# Patient Record
Sex: FEMALE | Race: WHITE | NOT HISPANIC OR LATINO | Employment: FULL TIME | URBAN - METROPOLITAN AREA
[De-identification: names, ages, dates, MRNs, and addresses within clinical notes are randomized per-mention and may not be internally consistent; named-entity substitution may affect disease eponyms.]

---

## 2017-04-18 ENCOUNTER — TRANSCRIBE ORDERS (OUTPATIENT)
Dept: ADMINISTRATIVE | Facility: HOSPITAL | Age: 42
End: 2017-04-18

## 2017-04-18 ENCOUNTER — HOSPITAL ENCOUNTER (OUTPATIENT)
Dept: ULTRASOUND IMAGING | Facility: HOSPITAL | Age: 42
Discharge: HOME | End: 2017-04-18
Admitting: NURSE PRACTITIONER

## 2017-04-18 ENCOUNTER — HOSPITAL ENCOUNTER (OUTPATIENT)
Dept: ULTRASOUND IMAGING | Facility: HOSPITAL | Age: 42
Discharge: HOME | End: 2017-04-18

## 2017-04-18 DIAGNOSIS — R10.31 ABDOMINAL PAIN, RLQ (RIGHT LOWER QUADRANT): ICD-10-CM

## 2017-04-18 DIAGNOSIS — R19.03 RLQ ABDOMINAL MASS: ICD-10-CM

## 2017-04-18 DIAGNOSIS — R19.03 ABDOMINAL SWELLING, RIGHT LOWER QUADRANT: ICD-10-CM

## 2017-04-18 DIAGNOSIS — R19.03 ABDOMINAL SWELLING, RIGHT LOWER QUADRANT: Primary | ICD-10-CM

## 2017-04-18 PROCEDURE — 76856 US EXAM PELVIC COMPLETE: CPT

## 2017-04-18 PROCEDURE — 76830 TRANSVAGINAL US NON-OB: CPT

## 2017-04-19 ENCOUNTER — APPOINTMENT (OUTPATIENT)
Dept: ULTRASOUND IMAGING | Facility: HOSPITAL | Age: 42
End: 2017-04-19

## 2017-10-17 ENCOUNTER — APPOINTMENT (OUTPATIENT)
Dept: GENERAL RADIOLOGY | Facility: HOSPITAL | Age: 42
End: 2017-10-17

## 2017-10-17 ENCOUNTER — HOSPITAL ENCOUNTER (EMERGENCY)
Facility: HOSPITAL | Age: 42
Discharge: HOME | End: 2017-10-17
Admitting: PHYSICIAN ASSISTANT

## 2017-10-17 VITALS
SYSTOLIC BLOOD PRESSURE: 110 MMHG | BODY MASS INDEX: 19.63 KG/M2 | OXYGEN SATURATION: 97 % | TEMPERATURE: 98.7 F | WEIGHT: 115 LBS | HEIGHT: 64 IN | HEART RATE: 70 BPM | RESPIRATION RATE: 14 BRPM | DIASTOLIC BLOOD PRESSURE: 82 MMHG

## 2017-10-17 DIAGNOSIS — J06.9 UPPER RESPIRATORY TRACT INFECTION, UNSPECIFIED TYPE: Primary | ICD-10-CM

## 2017-10-17 LAB
FLUAV AG NPH QL: NEGATIVE
FLUBV AG NPH QL IA: NEGATIVE

## 2017-10-17 PROCEDURE — 99284 EMERGENCY DEPT VISIT MOD MDM: CPT

## 2017-10-17 PROCEDURE — 87804 INFLUENZA ASSAY W/OPTIC: CPT

## 2017-10-17 PROCEDURE — 71020 HC CHEST PA AND LATERAL: CPT

## 2017-10-17 PROCEDURE — 99284 EMERGENCY DEPT VISIT MOD MDM: CPT | Performed by: PHYSICIAN ASSISTANT

## 2017-10-17 RX ORDER — ALBUTEROL SULFATE 90 UG/1
2 AEROSOL, METERED RESPIRATORY (INHALATION) EVERY 6 HOURS PRN
Qty: 1 INHALER | Refills: 0 | Status: SHIPPED | OUTPATIENT
Start: 2017-10-17 | End: 2019-06-03

## 2017-10-17 RX ORDER — BENZONATATE 100 MG/1
100 CAPSULE ORAL 3 TIMES DAILY PRN
Qty: 20 CAPSULE | Refills: 0 | Status: SHIPPED | OUTPATIENT
Start: 2017-10-17 | End: 2017-10-24

## 2017-10-17 RX ORDER — GUAIFENESIN 600 MG/1
600 TABLET, EXTENDED RELEASE ORAL ONCE
Status: COMPLETED | OUTPATIENT
Start: 2017-10-17 | End: 2017-10-17

## 2017-10-17 RX ORDER — SODIUM CHLORIDE 0.9 % (FLUSH) 0.9 %
10 SYRINGE (ML) INJECTION AS NEEDED
Status: DISCONTINUED | OUTPATIENT
Start: 2017-10-17 | End: 2017-10-17

## 2017-10-17 RX ORDER — BENZONATATE 100 MG/1
100 CAPSULE ORAL ONCE
Status: COMPLETED | OUTPATIENT
Start: 2017-10-17 | End: 2017-10-17

## 2017-10-17 RX ADMIN — GUAIFENESIN 600 MG: 600 TABLET, EXTENDED RELEASE ORAL at 14:31

## 2017-10-17 RX ADMIN — BENZONATATE 100 MG: 100 CAPSULE, LIQUID FILLED ORAL at 14:31

## 2017-10-17 NOTE — ED PROVIDER NOTES
"Subjective   History of Present Illness  History of Present Illness    Chief complaint: \"I think I have pneumonia\"    Location: Generalized    Quality/Severity:  Congested, moderate severe    Timing/Duration: 2 days.  Worse this morning.    Modifying Factors: Nothing makes worse or better    Associated Symptoms: Positive runny nose.  Positive nonproductive cough. Positive headache.  Denies neck pain.  Denies fevers or chills.  Denies ear pain.  Denies sore throat.  Denies chest pain.  Denies shortness of breath.    Narrative: 41-year-old female presents with runny nose and productive cough for 2 days that was worse this morning.  She went to the urgent care Center this morning.  When she was complaining of feeling congested and not being able to breathe they thought she was saying that she was short of breath and sent her to the emergency department.  She denies actually being short of breath.  She states she is just feeling congested like when she previously had pneumonia.  They did do a CBC with an normal white blood cell count and an EKG, which was also unremarkable.  Please see details below.    Review of Systems  General: Denies fevers or chills.  Denies any weakness or fatigue.  Denies any weight loss or weight gain.  SKIN: Denies any rashes lesions or ulcers.  Denies color change.  ENT: Denies sore throat. +  rhinorrhea.  Denies ear pain.    EYES: Denies any blurred vision.  Denies any change in vision.  Denies any photophobia.  Denies any vision loss.  LUNGS: Denies any shortness of breath or wheezing.  + cough.  Denies any hemoptysis.  CARDIAC: Denies any chest pain.  Denies palpitations.  Denies syncope.  Denies any edema  ABD: Denies any abdominal pain.  Denies any nausea or vomiting or diarrhea.  Denies any rectal bleeding.  Denies constipation  : Denies any dysuria, urgency, frequency or hematuria.  Denies discharge.  Denies flank pain.  NEURO: Denies any focal weakness.  Denies headache.  Denies " seizures.  Denies changes in speech or difficulty walking.  ENDOCRINE: Denies polydipsia and polyuria  M/S: Denies arthralgias, back pain, myalgias or neck pain  HEME/LYMPH: Negative for adenopathy. Does not bruise/bleed easily.   PSYCH: Negative for suicidal ideas. + anxiety  review was performed in addition to those in the above all other reviews are negative.      Past Medical History:   Diagnosis Date   • Anxiety    • Migraine        Allergies   Allergen Reactions   • Sulfamethoxazole-Trimethoprim        Past Surgical History:   Procedure Laterality Date   • TUBAL ABDOMINAL LIGATION         History reviewed. No pertinent family history.    Social History     Social History   • Marital status: Single     Spouse name: N/A   • Number of children: N/A   • Years of education: N/A     Social History Main Topics   • Smoking status: Never Smoker   • Smokeless tobacco: None   • Alcohol use No   • Drug use: Yes     Special: Methamphetamines, Marijuana      Comment: DRUG FREE SINCE 2009  SMOKED sUN   • Sexual activity: Yes     Birth control/ protection: None      Comment: TUBAL     Other Topics Concern   • None     Social History Narrative   • None     No current facility-administered medications on file prior to encounter.      No current outpatient prescriptions on file prior to encounter.           Objective   Physical Exam  Vitals:    10/17/17 1329   BP: 117/80   Pulse: 69   Resp: 20   Temp: 97.2 °F (36.2 °C)   SpO2: 100%     GENERAL: a/o x 4, NAD  SKIN: Warm pink and dry   HEENT:  PERRLA, EOM intact, conjunctiva normal, sclera clear, nasal congestion, TM clear, oropharynx clear without pharyngeal erythema, edema or exudate  NECK: supple, no JVD  LUNGS: Clear to auscultation bilaterally without wheezes, rales or rhonchi.  No accessory muscle use and no nasal flaring.  CARDIAC:  Regular rate and rhythm, S1-S2.  No murmurs, rubs or gallops.  No peripheral edema.  Equal pulses bilaterally.  ABDOMEN: Soft, nontender,  nondistended.  No guarding or rebound tenderness.  Normal bowel sounds.  MUSCULOSKELETAL: Moves all extremities well.  No deformity.  NEURO: Cranial nerves II through XII grossly intact.  No gross focal deficits.  Alert.  Normal speech and motor.  PSYCH: anxious      Procedures         ED Course  ED Course      EKG from [facility]      EKG time / Interpretation time:1254 / 1345  Rhythm/Rate: NSR 61   WV: 154  QRS, axis: 56   QTc 415  ST and T waves: inversion aVR   Interpreted Contemporaneously by me, independently viewed by me and MD.    CBC from [facility]  WBC 9.0, Hgb 14.8, Plt 419    Results for orders placed or performed during the hospital encounter of 10/17/17   Influenza Antigen, Rapid - Swab, Nasopharynx   Result Value Ref Range    Influenza A Ag, EIA Negative Negative    Influenza B Ag, EIA Negative Negative     Xr Chest 2 View    Result Date: 10/17/2017  Narrative: INDICATION:  Shortness of air cough and chest pain.  COMPARISON:  04/22/2016  FINDINGS: PA and lateral views of the chest.  Heart and mediastinal contours are normal.  The lungs are clear.  No pneumothorax or pleural effusion.      Impression: Normal chest radiograph.  This report was finalized on 10/17/2017 2:22 PM by Dr. Dev Worthy MD.      Reviewed CXR. Independently viewed by me. Interpreted by radiologist. Discussed with pt. No indication for antibiotics at this time as patient is afebrile with a normal white blood cell count.  She has a negative flu.  This is most likely another virus.  Patient has been given instructions on symptoms that would cause her to need to return to the emergency department.  She will follow up with primary care provider.  We will discharge home with Tessalon and albuterol.  She will take Mucinex over-the-counter.  She still does not have any SOA.  She states she is ready to go home because she has to feed her baby squirrel            MDM  Number of Diagnoses or Management Options  Upper respiratory tract infection,  unspecified type: new and requires workup     Amount and/or Complexity of Data Reviewed  Clinical lab tests: reviewed and ordered  Tests in the radiology section of CPT®: reviewed and ordered  Tests in the medicine section of CPT®: ordered and reviewed  Independent visualization of images, tracings, or specimens: yes    Risk of Complications, Morbidity, and/or Mortality  Presenting problems: moderate  Diagnostic procedures: low  Management options: low    Patient Progress  Patient progress: improved    Wells 0 = low probability for PE  My differential diagnosis for dyspnea includes but is not limited to:  Asthma, COPD, pneumonia, pulmonary embolus, acute respiratory distress syndrome, pneumothorax, pleural effusion, pulmonary fibrosis, congestive heart failure, myocardial infarction, DKA, uremia, acidosis, sepsis, anemia, drug related, hyperventilation, CNS disease    Final diagnoses:   Upper respiratory tract infection, unspecified type       Dictated utilizing Dragon dictation       Cheli Merrill PA-C  10/17/17 0218

## 2018-08-14 ENCOUNTER — TRANSCRIBE ORDERS (OUTPATIENT)
Dept: ADMINISTRATIVE | Facility: HOSPITAL | Age: 43
End: 2018-08-14

## 2018-08-14 DIAGNOSIS — Z13.9 SCREENING FOR CONDITION: Primary | ICD-10-CM

## 2018-09-04 ENCOUNTER — OFFICE VISIT (OUTPATIENT)
Dept: OBSTETRICS AND GYNECOLOGY | Facility: CLINIC | Age: 43
End: 2018-09-04

## 2018-09-04 VITALS
SYSTOLIC BLOOD PRESSURE: 100 MMHG | BODY MASS INDEX: 20.49 KG/M2 | HEIGHT: 64 IN | DIASTOLIC BLOOD PRESSURE: 62 MMHG | WEIGHT: 120 LBS

## 2018-09-04 DIAGNOSIS — R87.613 HGSIL (HIGH GRADE SQUAMOUS INTRAEPITHELIAL LESION) ON PAP SMEAR OF CERVIX: Primary | ICD-10-CM

## 2018-09-04 DIAGNOSIS — Z13.9 SCREENING FOR CONDITION: ICD-10-CM

## 2018-09-04 LAB
B-HCG UR QL: NEGATIVE
INTERNAL NEGATIVE CONTROL: NEGATIVE
INTERNAL POSITIVE CONTROL: POSITIVE
Lab: NORMAL

## 2018-09-04 PROCEDURE — 57454 BX/CURETT OF CERVIX W/SCOPE: CPT | Performed by: OBSTETRICS & GYNECOLOGY

## 2018-09-04 PROCEDURE — 81025 URINE PREGNANCY TEST: CPT | Performed by: OBSTETRICS & GYNECOLOGY

## 2018-09-04 NOTE — PROGRESS NOTES
Colposcopy Procedure Note    Indications: Most recent Pap smear showed: high-grade squamous intraepithelial neoplasia  (HGSIL-encompassing moderate and severe dysplasia).  Prior cervical/vaginal disease: normal exam without visible pathology.  Prior cervical treatment: cryosurgery.    Procedure Details   The risks and benefits of the procedure and Verbal informed consent obtained.    Speculum placed in vagina and excellent visualization of cervix achieved, cervix swabbed x 3 with acetic acid solution and Lugol's solution     Findings:  Cervix: visible lesion(s) at 6 o'clock and acetowhite lesion(s) noted at 6 o'clock; cervix swabbed with Lugol's solution, SCJ visualized - lesion at 6 o'clock, endocervical curettage performed, cervical biopsies taken at 6 o'clock, specimen labelled and sent to pathology and hemostasis achieved with Monsel's solution.  Vaginal inspection: vaginal colposcopy not performed.  Vulvar colposcopy: vulvar colposcopy not performed.    OBGyn Exam    Specimens: 6:00 and ECC    Complications: none.  Patient tolerated the procedure well.    Plan:  Specimens labelled and sent to Pathology.  Will base further treatment on Pathology findings.  Return to discuss Pathology results in 2 weeks.    COLUMBA Bower MD  9/4/2018  2:54 PM

## 2018-09-06 ENCOUNTER — TELEPHONE (OUTPATIENT)
Dept: OBSTETRICS AND GYNECOLOGY | Facility: CLINIC | Age: 43
End: 2018-09-06

## 2018-09-06 LAB
DX ICD CODE: NORMAL
DX ICD CODE: NORMAL
PATH REPORT.FINAL DX SPEC: NORMAL
PATH REPORT.GROSS SPEC: NORMAL
PATH REPORT.SITE OF ORIGIN SPEC: NORMAL
PATHOLOGIST NAME: NORMAL
PAYMENT PROCEDURE: NORMAL

## 2018-09-06 NOTE — TELEPHONE ENCOUNTER
Patient has colpo yesterday. She is c/o starting her period today and hurts with tampons. Want to know if she can have note to be off work today?

## 2018-09-18 ENCOUNTER — OFFICE VISIT (OUTPATIENT)
Dept: OBSTETRICS AND GYNECOLOGY | Facility: CLINIC | Age: 43
End: 2018-09-18

## 2018-09-18 VITALS
BODY MASS INDEX: 20.32 KG/M2 | SYSTOLIC BLOOD PRESSURE: 110 MMHG | DIASTOLIC BLOOD PRESSURE: 60 MMHG | HEIGHT: 64 IN | WEIGHT: 119 LBS

## 2018-09-18 DIAGNOSIS — R87.613 HGSIL (HIGH GRADE SQUAMOUS INTRAEPITHELIAL LESION) ON PAP SMEAR OF CERVIX: Primary | ICD-10-CM

## 2018-09-18 PROCEDURE — 99213 OFFICE O/P EST LOW 20 MIN: CPT | Performed by: OBSTETRICS & GYNECOLOGY

## 2018-09-18 NOTE — PROGRESS NOTES
"      Luanne Sun is a 42 y.o. patient who presents for follow up of   Chief Complaint   Patient presents with   • Follow-up     colpo results       HPI 42-year-old female who recently had a high-grade Pap followed by colposcopy.  Postoperatively the patient did well.  She had some vaginal bleeding that occurred afterwards but still has stopped since she has no pain and no fevers.    The following portions of the patient's history were reviewed and updated as appropriate: allergies, current medications and problem list.    Review of Systems   Constitutional: Negative for appetite change, fever and unexpected weight change.   Respiratory: Negative for cough and shortness of breath.    Cardiovascular: Negative for chest pain and palpitations.   Gastrointestinal: Negative for abdominal distention, abdominal pain, constipation, diarrhea, nausea and vomiting.   Endocrine: Negative.    Genitourinary: Negative for dyspareunia, menstrual problem, pelvic pain and vaginal discharge.   Skin: Negative.    Hematological: Negative.    Psychiatric/Behavioral: Negative for dysphoric mood and sleep disturbance. The patient is not nervous/anxious.        /60   Ht 162.6 cm (64\")   Wt 54 kg (119 lb)   BMI 20.43 kg/m²     Physical Exam   Constitutional: She is oriented to person, place, and time. She appears well-developed and well-nourished.   HENT:   Head: Normocephalic and atraumatic.   Pulmonary/Chest: Effort normal.   Abdominal: Soft. She exhibits no distension and no mass. There is no tenderness. There is no rebound and no guarding.   Musculoskeletal: Normal range of motion.   Neurological: She is alert and oriented to person, place, and time.   Skin: Skin is warm and dry.   Psychiatric: She has a normal mood and affect. Her behavior is normal. Judgment and thought content normal.   Nursing note and vitals reviewed.        Assessment/Plan    Luanne was seen today for follow-up.    Diagnoses and all orders for this " visit:    HGSIL (high grade squamous intraepithelial lesion) on Pap smear of cervix    Biopsy and ECC were reviewed with the patient both normal.  Obviously there is some high-grade squamous lesions there which were not biopsied.  I recommended a LEEP.  Patient requested to be done under general anesthesia she would not tolerate office procedures.  The procedure was described in detail and she desires to schedule.    Return for LEEP in OR.      Sorin Bower MD  9/18/2018  2:47 PM

## 2018-09-21 ENCOUNTER — PREP FOR SURGERY (OUTPATIENT)
Dept: OTHER | Facility: HOSPITAL | Age: 43
End: 2018-09-21

## 2018-09-21 ENCOUNTER — ANESTHESIA EVENT (OUTPATIENT)
Dept: PERIOP | Facility: HOSPITAL | Age: 43
End: 2018-09-21

## 2018-09-21 DIAGNOSIS — R87.613 HGSIL (HIGH GRADE SQUAMOUS INTRAEPITHELIAL LESION) ON PAP SMEAR OF CERVIX: Primary | ICD-10-CM

## 2018-09-21 RX ORDER — SODIUM CHLORIDE 0.9 % (FLUSH) 0.9 %
1-10 SYRINGE (ML) INJECTION AS NEEDED
Status: CANCELLED | OUTPATIENT
Start: 2018-09-21

## 2018-09-21 RX ORDER — SODIUM CHLORIDE 9 MG/ML
40 INJECTION, SOLUTION INTRAVENOUS AS NEEDED
Status: CANCELLED | OUTPATIENT
Start: 2018-09-21

## 2018-09-24 ENCOUNTER — ANESTHESIA (OUTPATIENT)
Dept: PERIOP | Facility: HOSPITAL | Age: 43
End: 2018-09-24

## 2018-09-24 ENCOUNTER — HOSPITAL ENCOUNTER (OUTPATIENT)
Facility: HOSPITAL | Age: 43
Setting detail: HOSPITAL OUTPATIENT SURGERY
Discharge: HOME | End: 2018-09-24
Attending: OBSTETRICS & GYNECOLOGY | Admitting: OBSTETRICS & GYNECOLOGY

## 2018-09-24 VITALS
WEIGHT: 118.4 LBS | DIASTOLIC BLOOD PRESSURE: 89 MMHG | BODY MASS INDEX: 20.22 KG/M2 | TEMPERATURE: 98.7 F | HEIGHT: 64 IN | SYSTOLIC BLOOD PRESSURE: 117 MMHG | RESPIRATION RATE: 15 BRPM | HEART RATE: 87 BPM | OXYGEN SATURATION: 98 %

## 2018-09-24 DIAGNOSIS — R87.613 HGSIL (HIGH GRADE SQUAMOUS INTRAEPITHELIAL LESION) ON PAP SMEAR OF CERVIX: ICD-10-CM

## 2018-09-24 LAB — HCG SERPL QL: NEGATIVE

## 2018-09-24 PROCEDURE — 25010000002 FENTANYL CITRATE (PF) 100 MCG/2ML SOLUTION: Performed by: NURSE ANESTHETIST, CERTIFIED REGISTERED

## 2018-09-24 PROCEDURE — 57522 CONIZATION OF CERVIX: CPT | Performed by: OBSTETRICS & GYNECOLOGY

## 2018-09-24 PROCEDURE — 25010000002 ONDANSETRON PER 1 MG: Performed by: NURSE ANESTHETIST, CERTIFIED REGISTERED

## 2018-09-24 PROCEDURE — 25010000002 MIDAZOLAM PER 1 MG: Performed by: NURSE ANESTHETIST, CERTIFIED REGISTERED

## 2018-09-24 PROCEDURE — 25010000002 PROPOFOL 10 MG/ML EMULSION: Performed by: NURSE ANESTHETIST, CERTIFIED REGISTERED

## 2018-09-24 PROCEDURE — 84703 CHORIONIC GONADOTROPIN ASSAY: CPT | Performed by: OBSTETRICS & GYNECOLOGY

## 2018-09-24 PROCEDURE — 25010000002 KETOROLAC TROMETHAMINE PER 15 MG: Performed by: NURSE ANESTHETIST, CERTIFIED REGISTERED

## 2018-09-24 PROCEDURE — 25010000002 DEXAMETHASONE PER 1 MG: Performed by: NURSE ANESTHETIST, CERTIFIED REGISTERED

## 2018-09-24 RX ORDER — SODIUM CHLORIDE 0.9 % (FLUSH) 0.9 %
1-10 SYRINGE (ML) INJECTION AS NEEDED
Status: DISCONTINUED | OUTPATIENT
Start: 2018-09-24 | End: 2018-09-24 | Stop reason: HOSPADM

## 2018-09-24 RX ORDER — MIDAZOLAM HYDROCHLORIDE 1 MG/ML
1 INJECTION INTRAMUSCULAR; INTRAVENOUS
Status: DISCONTINUED | OUTPATIENT
Start: 2018-09-24 | End: 2018-09-24 | Stop reason: HOSPADM

## 2018-09-24 RX ORDER — ONDANSETRON 2 MG/ML
4 INJECTION INTRAMUSCULAR; INTRAVENOUS ONCE AS NEEDED
Status: COMPLETED | OUTPATIENT
Start: 2018-09-24 | End: 2018-09-24

## 2018-09-24 RX ORDER — PROPOFOL 10 MG/ML
VIAL (ML) INTRAVENOUS AS NEEDED
Status: DISCONTINUED | OUTPATIENT
Start: 2018-09-24 | End: 2018-09-24 | Stop reason: SURG

## 2018-09-24 RX ORDER — LIDOCAINE HYDROCHLORIDE 10 MG/ML
0.5 INJECTION, SOLUTION EPIDURAL; INFILTRATION; INTRACAUDAL; PERINEURAL ONCE AS NEEDED
Status: COMPLETED | OUTPATIENT
Start: 2018-09-24 | End: 2018-09-24

## 2018-09-24 RX ORDER — FENTANYL CITRATE 50 UG/ML
INJECTION, SOLUTION INTRAMUSCULAR; INTRAVENOUS AS NEEDED
Status: DISCONTINUED | OUTPATIENT
Start: 2018-09-24 | End: 2018-09-24 | Stop reason: SURG

## 2018-09-24 RX ORDER — MIDAZOLAM HYDROCHLORIDE 1 MG/ML
2 INJECTION INTRAMUSCULAR; INTRAVENOUS
Status: DISCONTINUED | OUTPATIENT
Start: 2018-09-24 | End: 2018-09-24 | Stop reason: HOSPADM

## 2018-09-24 RX ORDER — FERRIC SUBSULFATE 0.21 G/G
LIQUID TOPICAL AS NEEDED
Status: DISCONTINUED | OUTPATIENT
Start: 2018-09-24 | End: 2018-09-24 | Stop reason: HOSPADM

## 2018-09-24 RX ORDER — SODIUM CHLORIDE 9 MG/ML
40 INJECTION, SOLUTION INTRAVENOUS AS NEEDED
Status: DISCONTINUED | OUTPATIENT
Start: 2018-09-24 | End: 2018-09-24 | Stop reason: HOSPADM

## 2018-09-24 RX ORDER — KETOROLAC TROMETHAMINE 30 MG/ML
INJECTION, SOLUTION INTRAMUSCULAR; INTRAVENOUS AS NEEDED
Status: DISCONTINUED | OUTPATIENT
Start: 2018-09-24 | End: 2018-09-24 | Stop reason: SURG

## 2018-09-24 RX ORDER — PYRANTEL PAMOATE 100 %
POWDER (GRAM) MISCELLANEOUS AS NEEDED
Status: DISCONTINUED | OUTPATIENT
Start: 2018-09-24 | End: 2018-09-24 | Stop reason: HOSPADM

## 2018-09-24 RX ORDER — MAGNESIUM HYDROXIDE 1200 MG/15ML
LIQUID ORAL AS NEEDED
Status: DISCONTINUED | OUTPATIENT
Start: 2018-09-24 | End: 2018-09-24 | Stop reason: HOSPADM

## 2018-09-24 RX ORDER — DEXAMETHASONE SODIUM PHOSPHATE 4 MG/ML
8 INJECTION, SOLUTION INTRA-ARTICULAR; INTRALESIONAL; INTRAMUSCULAR; INTRAVENOUS; SOFT TISSUE ONCE AS NEEDED
Status: COMPLETED | OUTPATIENT
Start: 2018-09-24 | End: 2018-09-24

## 2018-09-24 RX ORDER — HYDROCODONE BITARTRATE AND ACETAMINOPHEN 5; 325 MG/1; MG/1
2 TABLET ORAL EVERY 4 HOURS PRN
Qty: 20 TABLET | Refills: 0 | Status: SHIPPED | OUTPATIENT
Start: 2018-09-24 | End: 2019-06-03

## 2018-09-24 RX ORDER — FAMOTIDINE 20 MG/1
20 TABLET, FILM COATED ORAL EVERY 12 HOURS SCHEDULED
Status: DISCONTINUED | OUTPATIENT
Start: 2018-09-24 | End: 2018-09-24 | Stop reason: HOSPADM

## 2018-09-24 RX ORDER — SODIUM CHLORIDE, SODIUM LACTATE, POTASSIUM CHLORIDE, CALCIUM CHLORIDE 600; 310; 30; 20 MG/100ML; MG/100ML; MG/100ML; MG/100ML
9 INJECTION, SOLUTION INTRAVENOUS CONTINUOUS
Status: DISCONTINUED | OUTPATIENT
Start: 2018-09-24 | End: 2018-09-24 | Stop reason: HOSPADM

## 2018-09-24 RX ORDER — GLYCOPYRROLATE 0.2 MG/ML
0.1 INJECTION INTRAMUSCULAR; INTRAVENOUS
Status: DISCONTINUED | OUTPATIENT
Start: 2018-09-24 | End: 2018-09-24 | Stop reason: HOSPADM

## 2018-09-24 RX ADMIN — DEXAMETHASONE SODIUM PHOSPHATE 8 MG: 4 INJECTION, SOLUTION INTRAMUSCULAR; INTRAVENOUS at 11:00

## 2018-09-24 RX ADMIN — PROPOFOL 200 MG: 10 INJECTION, EMULSION INTRAVENOUS at 11:47

## 2018-09-24 RX ADMIN — GLYCOPYRROLATE 0.1 MG: 0.2 INJECTION INTRAMUSCULAR; INTRAVENOUS at 11:00

## 2018-09-24 RX ADMIN — ONDANSETRON 4 MG: 2 INJECTION INTRAMUSCULAR; INTRAVENOUS at 11:00

## 2018-09-24 RX ADMIN — MIDAZOLAM HYDROCHLORIDE 1 MG: 1 INJECTION, SOLUTION INTRAMUSCULAR; INTRAVENOUS at 11:39

## 2018-09-24 RX ADMIN — FAMOTIDINE 20 MG: 10 INJECTION, SOLUTION INTRAVENOUS at 11:00

## 2018-09-24 RX ADMIN — FENTANYL CITRATE 50 MCG: 50 INJECTION, SOLUTION INTRAMUSCULAR; INTRAVENOUS at 11:51

## 2018-09-24 RX ADMIN — SODIUM CHLORIDE, POTASSIUM CHLORIDE, SODIUM LACTATE AND CALCIUM CHLORIDE 9 ML/HR: 600; 310; 30; 20 INJECTION, SOLUTION INTRAVENOUS at 10:36

## 2018-09-24 RX ADMIN — KETOROLAC TROMETHAMINE 30 MG: 30 INJECTION INTRAMUSCULAR; INTRAVENOUS at 11:51

## 2018-09-24 RX ADMIN — LIDOCAINE HYDROCHLORIDE 0.5 ML: 10 INJECTION, SOLUTION EPIDURAL; INFILTRATION; INTRACAUDAL; PERINEURAL at 10:36

## 2018-09-24 NOTE — ANESTHESIA PREPROCEDURE EVALUATION
Anesthesia Evaluation     Patient summary reviewed and Nursing notes reviewed   no history of anesthetic complications:  NPO Solid Status: > 8 hours  NPO Liquid Status: > 2 hours           Airway   Mallampati: I  TM distance: >3 FB  Neck ROM: full  No difficulty expected  Dental      Comment: Very loose lower R      Pulmonary - negative pulmonary ROS and normal exam    breath sounds clear to auscultation  Cardiovascular - negative cardio ROS and normal exam  Exercise tolerance: excellent (>7 METS)    Rhythm: regular  Rate: normal        Neuro/Psych  (+) headaches, psychiatric history Anxiety,     GI/Hepatic/Renal/Endo    (+)  GERD (Rx w/ OTC prn),      Musculoskeletal (-) negative ROS    Abdominal  - normal exam   Substance History - negative use     OB/GYN      Comment: HGSIL (high grade squamous intraepithelial lesion) on Pap smear of cervix      Other - negative ROS       (-) history of cancer                  Anesthesia Plan    ASA 2     general     intravenous induction   Anesthetic plan, all risks, benefits, and alternatives have been provided, discussed and informed consent has been obtained with: patient.  Use of blood products discussed with patient  Consented to blood products.

## 2018-09-24 NOTE — ANESTHESIA PROCEDURE NOTES
Airway  Urgency: elective    Date/Time: 9/24/2018 11:48 AM  End Time:9/24/2018 11:48 AM  Airway not difficult    General Information and Staff    Patient location during procedure: OR  CRNA: ULICES GALLO    Indications and Patient Condition    Preoxygenated: yes  MILS maintained throughout  Mask difficulty assessment: 0 - not attempted    Final Airway Details  Final airway type: supraglottic airway      Successful airway: unique  Size 3    Number of attempts at approach: 1    Additional Comments  BEBS, +ETCO2, VSS. TEETH AND GUMS AS PRE-OP.

## 2018-09-27 LAB
LAB AP CASE REPORT: NORMAL
LAB AP CLINICAL INFORMATION: NORMAL
PATH REPORT.FINAL DX SPEC: NORMAL

## 2018-10-09 ENCOUNTER — OFFICE VISIT (OUTPATIENT)
Dept: OBSTETRICS AND GYNECOLOGY | Facility: CLINIC | Age: 43
End: 2018-10-09

## 2018-10-09 VITALS
HEIGHT: 64 IN | BODY MASS INDEX: 21 KG/M2 | SYSTOLIC BLOOD PRESSURE: 102 MMHG | DIASTOLIC BLOOD PRESSURE: 68 MMHG | WEIGHT: 123 LBS

## 2018-10-09 DIAGNOSIS — R87.613 HGSIL (HIGH GRADE SQUAMOUS INTRAEPITHELIAL LESION) ON PAP SMEAR OF CERVIX: Primary | ICD-10-CM

## 2018-10-09 PROCEDURE — 99024 POSTOP FOLLOW-UP VISIT: CPT | Performed by: OBSTETRICS & GYNECOLOGY

## 2018-10-09 NOTE — PROGRESS NOTES
"      Luanne Sun is a 42 y.o. patient who presents for follow up of   Chief Complaint   Patient presents with   • Post-op       HPI 42-year-old female 2 weeks status post LEEP for high-grade squamous intraepithelial lesion of the cervix.  She reports her bleeding has stopped.  She has no pain currently.  She complains of right arm bruising from her IV.  It is tender to the touch but she does not palpate any cords.    Past Medical History:   Diagnosis Date   • Anxiety    • Migraine        Past Surgical History:   Procedure Laterality Date   • LEEP N/A 9/24/2018    Procedure: LOOP ELECTROCAUTERY EXCISION PROCEDURE BIOPSY OF CERVIX;  Surgeon: Sorin Bower MD;  Location: [facility];  Service: Obstetrics/Gynecology   • TUBAL ABDOMINAL LIGATION     • TUBAL ABDOMINAL LIGATION         The following portions of the patient's history were reviewed and updated as appropriate: allergies, current medications and problem list.    Review of Systems   Constitutional: Negative for appetite change, fever and unexpected weight change.   HENT: Negative for congestion and sore throat.    Respiratory: Negative for cough and shortness of breath.    Cardiovascular: Negative for chest pain and palpitations.   Gastrointestinal: Negative for abdominal distention, abdominal pain, constipation, diarrhea, nausea and vomiting.   Endocrine: Negative.    Genitourinary: Negative for dyspareunia, menstrual problem, pelvic pain and vaginal discharge.   Skin: Negative.    Neurological: Negative for dizziness and syncope.   Hematological: Negative.    Psychiatric/Behavioral: Negative for dysphoric mood and sleep disturbance. The patient is not nervous/anxious.        /68   Ht 162.6 cm (64\")   Wt 55.8 kg (123 lb)   LMP 09/11/2018   BMI 21.11 kg/m²     Physical Exam   Constitutional: She is oriented to person, place, and time. She appears well-developed and well-nourished.   HENT:   Head: Normocephalic and atraumatic. "   Pulmonary/Chest: Effort normal. No respiratory distress.   Abdominal: Soft. She exhibits no distension and no mass. There is no tenderness. There is no rebound and no guarding.   Musculoskeletal: Normal range of motion.   Neurological: She is alert and oriented to person, place, and time.   Skin: Skin is warm and dry.   Psychiatric: She has a normal mood and affect. Her behavior is normal. Judgment and thought content normal.   Nursing note and vitals reviewed.   2 patches of quarter size bruising on the right forearm.  No cords palpated.    Pathology was reviewed with the patient.  High grade squamous lesion seen.  No invasive carcinoma identified.  Clear margins.      Assessment/Plan    Luanne was seen today for post-op.    Diagnoses and all orders for this visit:    HGSIL (high grade squamous intraepithelial lesion) on Pap smear of cervix    Return to office in 6 months for Pap only.    Return in about 6 months (around 4/9/2019) for pap only.      Sorin Bower MD  10/9/2018  11:37 AM

## 2019-04-30 ENCOUNTER — OFFICE VISIT (OUTPATIENT)
Dept: OBSTETRICS AND GYNECOLOGY | Facility: CLINIC | Age: 44
End: 2019-04-30

## 2019-04-30 VITALS
HEIGHT: 64 IN | BODY MASS INDEX: 21.34 KG/M2 | DIASTOLIC BLOOD PRESSURE: 60 MMHG | SYSTOLIC BLOOD PRESSURE: 110 MMHG | WEIGHT: 125 LBS

## 2019-04-30 DIAGNOSIS — R87.613 HGSIL (HIGH GRADE SQUAMOUS INTRAEPITHELIAL LESION) ON PAP SMEAR OF CERVIX: ICD-10-CM

## 2019-04-30 DIAGNOSIS — Z01.419 PAP SMEAR, LOW-RISK: Primary | ICD-10-CM

## 2019-04-30 DIAGNOSIS — Z11.51 SPECIAL SCREENING EXAMINATION FOR HUMAN PAPILLOMAVIRUS (HPV): ICD-10-CM

## 2019-04-30 PROCEDURE — 99212 OFFICE O/P EST SF 10 MIN: CPT | Performed by: OBSTETRICS & GYNECOLOGY

## 2019-04-30 NOTE — PROGRESS NOTES
"      Luanne Sun is a 43 y.o. patient who presents for follow up of   Chief Complaint   Patient presents with   • Follow-up       HPI Here for 1st pap since [facility], HGSIL.     The following portions of the patient's history were reviewed and updated as appropriate: allergies, current medications and problem list.    Review of Systems    /60   Ht 162.6 cm (64\")   Wt 56.7 kg (125 lb)   Breastfeeding? No   BMI 21.46 kg/m²     Physical Exam   Constitutional: She is oriented to person, place, and time. She appears well-developed and well-nourished.   HENT:   Head: Normocephalic and atraumatic.   Pulmonary/Chest: Effort normal. No respiratory distress.   Abdominal: Soft. She exhibits no distension and no mass. There is no tenderness. There is no rebound and no guarding.   Genitourinary: There is no rash, tenderness or lesion on the right labia. There is no rash, tenderness or lesion on the left labia. Cervix exhibits no motion tenderness, no discharge and no friability. No erythema, tenderness or bleeding in the vagina. No foreign body in the vagina. No signs of injury around the vagina. No vaginal discharge found.   Musculoskeletal: Normal range of motion.   Neurological: She is alert and oriented to person, place, and time.   Skin: Skin is warm and dry.   Psychiatric: She has a normal mood and affect. Her behavior is normal. Judgment and thought content normal.   Nursing note and vitals reviewed.        Assessment/Plan    Luanne was seen today for follow-up.    Diagnoses and all orders for this visit:    HGSIL (high grade squamous intraepithelial lesion) on Pap smear of cervix    Pap done.    Return in about 6 months (around 10/30/2019).      Sorin Bower MD  4/30/2019  4:02 PM  "

## 2019-05-02 LAB
CYTOLOGIST CVX/VAG CYTO: NORMAL
CYTOLOGY CVX/VAG DOC THIN PREP: NORMAL
DX ICD CODE: NORMAL
HIV 1 & 2 AB SER-IMP: NORMAL
HPV I/H RISK 1 DNA CVX QL PROBE+SIG AMP: NEGATIVE
Lab: NORMAL
OTHER STN SPEC: NORMAL
PATH REPORT.FINAL DX SPEC: NORMAL
STAT OF ADQ CVX/VAG CYTO-IMP: NORMAL

## 2019-06-03 ENCOUNTER — APPOINTMENT (OUTPATIENT)
Dept: GENERAL RADIOLOGY | Facility: HOSPITAL | Age: 44
End: 2019-06-03

## 2019-06-03 ENCOUNTER — HOSPITAL ENCOUNTER (EMERGENCY)
Facility: HOSPITAL | Age: 44
Discharge: HOME | End: 2019-06-03
Attending: EMERGENCY MEDICINE | Admitting: EMERGENCY MEDICINE

## 2019-06-03 VITALS
SYSTOLIC BLOOD PRESSURE: 119 MMHG | HEART RATE: 80 BPM | BODY MASS INDEX: 21.34 KG/M2 | DIASTOLIC BLOOD PRESSURE: 77 MMHG | OXYGEN SATURATION: 100 % | WEIGHT: 125 LBS | HEIGHT: 64 IN | RESPIRATION RATE: 18 BRPM | TEMPERATURE: 99 F

## 2019-06-03 DIAGNOSIS — M25.511 CHRONIC RIGHT SHOULDER PAIN: ICD-10-CM

## 2019-06-03 DIAGNOSIS — M77.12 LEFT LATERAL EPICONDYLITIS: Primary | ICD-10-CM

## 2019-06-03 DIAGNOSIS — G89.29 CHRONIC RIGHT SHOULDER PAIN: ICD-10-CM

## 2019-06-03 PROCEDURE — 73030 X-RAY EXAM OF SHOULDER: CPT

## 2019-06-03 PROCEDURE — 99283 EMERGENCY DEPT VISIT LOW MDM: CPT

## 2019-06-03 PROCEDURE — 99282 EMERGENCY DEPT VISIT SF MDM: CPT | Performed by: EMERGENCY MEDICINE

## 2019-06-03 PROCEDURE — 73080 X-RAY EXAM OF ELBOW: CPT

## 2019-06-03 RX ORDER — IBUPROFEN 400 MG/1
800 TABLET ORAL ONCE
Status: COMPLETED | OUTPATIENT
Start: 2019-06-03 | End: 2019-06-03

## 2019-06-03 RX ADMIN — IBUPROFEN 800 MG: 400 TABLET ORAL at 13:18

## 2019-06-04 NOTE — ED PROVIDER NOTES
EMERGENCY DEPARTMENT ENCOUNTER      Room Number: 7/07    History is provided by the patient, no translation services needed    HPI:    Chief complaint: Right shoulder pain, left elbow pain    Location: Right shoulder, left elbow    Quality/Severity: Moderate to severe    Timing/Duration: Right shoulder pain x2 months, left elbow pain since this morning    Modifying Factors: Worsens with movement, has not had any medication prior to arrival.    Associated Symptoms: Positive for right shoulder pain and left elbow pain.  Denies any specific injury, nausea, vomiting, chest pain, shortness of breath, or changes in sensation.    Narrative: Pt is a 43 y.o. female who presents complaining of right shoulder pain for 2 months and left elbow pain since this morning.  Patient states she works at [employer] and does a lot of heavy lifting and repetitive movements.  She states her right shoulder pain increases with abduction.  She states she has not seen her primary care provider for either complaint, she called their office today but they did not have any openings so she came to the ED.      PMD: Warren Hassan MD    REVIEW OF SYSTEMS  Review of Systems   Constitutional: Negative for chills, fatigue and fever.   Respiratory: Negative for cough and shortness of breath.    Cardiovascular: Negative for chest pain.   Gastrointestinal: Negative for abdominal pain, nausea and vomiting.   Musculoskeletal: Positive for arthralgias. Negative for back pain, gait problem and neck pain.   Skin: Negative for color change, pallor and wound.   Neurological: Negative for dizziness, syncope, facial asymmetry, speech difficulty and numbness.   Psychiatric/Behavioral: Negative for confusion. The patient is not nervous/anxious.        PAST MEDICAL HISTORY  Active Ambulatory Problems     Diagnosis Date Noted   • HGSIL (high grade squamous intraepithelial lesion) on Pap smear of cervix 09/21/2018     Resolved Ambulatory Problems      Diagnosis Date Noted   • No Resolved Ambulatory Problems     Past Medical History:   Diagnosis Date   • Anxiety    • Migraine        PAST SURGICAL HISTORY  Past Surgical History:   Procedure Laterality Date   • LEEP N/A 9/24/2018    Procedure: LOOP ELECTROCAUTERY EXCISION PROCEDURE BIOPSY OF CERVIX;  Surgeon: Sorin Bower MD;  Location: [facility];  Service: Obstetrics/Gynecology   • TUBAL ABDOMINAL LIGATION     • TUBAL ABDOMINAL LIGATION         FAMILY HISTORY  History reviewed. No pertinent family history.    SOCIAL HISTORY  Social History     Socioeconomic History   • Marital status: Single     Spouse name: Not on file   • Number of children: Not on file   • Years of education: Not on file   • Highest education level: Not on file   Tobacco Use   • Smoking status: Never Smoker   • Smokeless tobacco: Never Used   Substance and Sexual Activity   • Alcohol use: No   • Drug use: Yes     Types: Methamphetamines, Marijuana     Comment: DRUG FREE SINCE 2009  SMOKED sUN   • Sexual activity: Yes     Birth control/protection: None     Comment: TUBAL       ALLERGIES  Sulfamethoxazole-trimethoprim    No current facility-administered medications for this encounter.     Current Outpatient Medications:   •  Aspirin-Salicylamide-Caffeine (BC HEADACHE POWDER PO), Take 1 packet by mouth As Needed., Disp: , Rfl:   •  Multiple Vitamins-Minerals (ADULT GUMMY PO), Take 1 tablet by mouth Daily., Disp: , Rfl:     PHYSICAL EXAM  ED Triage Vitals [06/03/19 1152]   Temp Heart Rate Resp BP SpO2   99 °F (37.2 °C) 80 18 119/77 100 %      Temp src Heart Rate Source Patient Position BP Location FiO2 (%)   Temporal Monitor Sitting Right arm --       Physical Exam   Constitutional: She is oriented to person, place, and time and well-developed, well-nourished, and in no distress.   HENT:   Head: Normocephalic and atraumatic.   Cardiovascular: Normal rate, regular rhythm, normal heart sounds and intact distal pulses.   Pulmonary/Chest:  Effort normal and breath sounds normal.   Musculoskeletal: She exhibits no edema or deformity.        Right shoulder: She exhibits decreased range of motion (Extent of active abduction is 90 degrees.).        Left elbow: She exhibits normal range of motion, no swelling and no deformity. Tenderness found. Lateral epicondyle tenderness noted.   Neurological: She is alert and oriented to person, place, and time. Gait normal.   Skin: Skin is warm and dry. No erythema.   Psychiatric: Mood, memory, affect and judgment normal.         LAB RESULTS        I ordered the above labs and reviewed the results    RADIOLOGY  Xr Shoulder 2+ View Right    Result Date: 6/3/2019  Narrative: RIGHT SHOULDER, 06/03/2019     HISTORY: 43-year-old female with 2 month history left elbow pain and right shoulder pain, which she originally noted after heavy lifting.  TECHNIQUE: Two view right shoulder series.  FINDINGS: The examination is negative. No fracture, dislocation or other acute osseous abnormality is demonstrated. Glenohumeral and acromioclavicular joints are unremarkable.      Impression: Negative right shoulder series.  This report was finalized on 6/3/2019 12:54 PM by Dr. Roque Mason MD.      Xr Elbow 3+ View Left    Result Date: 6/3/2019  Narrative: LEFT ELBOW, 06/03/2019     HISTORY: 43-year-old female with 2 month history left elbow pain and right shoulder pain, which she originally noted after heavy lifting.  TECHNIQUE: Three-view left elbow series.  FINDINGS: The examination is negative. No fracture, dislocation, arthropathy or other osseous abnormality. No visible joint effusion.      Impression: Negative left elbow series.  This report was finalized on 6/3/2019 12:53 PM by Dr. Roque Mason MD.        I ordered the above radiologic testing and reviewed the results    PROCEDURES  Procedures      PROGRESS AND CONSULTS  ED Course as of Jun 03 2247 Mon Jun 03, 2019   1315 Discussed x-ray results with patient informed  her her x-rays are negative for any fractures or dislocations.  Discussed that she may need additional imaging of her right shoulder and possibly physical therapy.  Recommended she follow-up with her primary care doctor for further evaluation.  She may take ibuprofen or Tylenol as directed over-the-counter for pain.  Patient is agreeable with plan and verbalizes understanding.  [KS]      ED Course User Index  [KS] Dominga Hall PA-C           MEDICAL DECISION MAKING  Results were reviewed/discussed with the patient and they were also made aware of online access. Pt also made aware that some labs, such as cultures, will not be resulted during ER visit and follow up with PMD is necessary.     MDM  Number of Diagnoses or Management Options  Chronic right shoulder pain:   Left lateral epicondylitis:      Amount and/or Complexity of Data Reviewed  Tests in the radiology section of CPT®: reviewed and ordered  Independent visualization of images, tracings, or specimens: yes    Risk of Complications, Morbidity, and/or Mortality  Presenting problems: low  Diagnostic procedures: low  Management options: low    Patient Progress  Patient progress: improved      My differential diagnosis includes but is not limited to contusions of the shoulder, forearm, arm, wrist, elbow or hand, dislocations of shoulder, elbow, wrist, digits, shoulder sprain, elbow sprain, wrist sprain, digit sprain, shoulder strain, arm strain, forearm strain, elbow strain, wrist strain, hand sprain, digit strain, lacerations of the upper extremity, fractures both closed and open of radius, ulna and humerus      DIAGNOSIS  Final diagnoses:   Left lateral epicondylitis   Chronic right shoulder pain       Latest Documented Vital Signs:  As of 10:47 PM  BP- 119/77 HR- 80 Temp- 99 °F (37.2 °C) (Temporal) O2 sat- 100%    DISPOSITION  Patient discharged home with instructions to follow-up with primary care provider.    Discussed pertinent imaging findings  with the patient/family.  Patient/Family voiced understanding of need to follow-up for recheck, further testing as needed.  Return to the emergency Department warnings were given.         Medication List      No changes were made to your prescriptions during this visit.           Follow-up Information     Warren Hassan MD. Call today.    Specialty:  Family Medicine  Why:  To schedule follow-up appointment  Contact information:  [address]  [place] [address] [phone]                     Dictated utilizing Dragon dictation       Dominga Hall PA-C  06/03/19 7618

## 2019-08-14 ENCOUNTER — LAB (OUTPATIENT)
Dept: LAB | Facility: HOSPITAL | Age: 44
End: 2019-08-14

## 2019-08-14 ENCOUNTER — TRANSCRIBE ORDERS (OUTPATIENT)
Dept: ADMINISTRATIVE | Facility: HOSPITAL | Age: 44
End: 2019-08-14

## 2019-08-14 DIAGNOSIS — R19.7 DIARRHEA, UNSPECIFIED TYPE: ICD-10-CM

## 2019-08-14 DIAGNOSIS — R19.7 DIARRHEA, UNSPECIFIED TYPE: Primary | ICD-10-CM

## 2019-08-14 LAB
ADV 40+41 DNA STL QL NAA+NON-PROBE: NOT DETECTED
ASTRO TYP 1-8 RNA STL QL NAA+NON-PROBE: NOT DETECTED
C CAYETANENSIS DNA STL QL NAA+NON-PROBE: NOT DETECTED
C DIFF TOX GENS STL QL NAA+PROBE: NEGATIVE
CAMPY SP DNA.DIARRHEA STL QL NAA+PROBE: NOT DETECTED
CRYPTOSP STL CULT: NOT DETECTED
E COLI DNA SPEC QL NAA+PROBE: NOT DETECTED
E HISTOLYT AG STL-ACNC: NOT DETECTED
EAEC PAA PLAS AGGR+AATA ST NAA+NON-PRB: NOT DETECTED
EC STX1 + STX2 GENES STL NAA+PROBE: NOT DETECTED
EPEC EAE GENE STL QL NAA+NON-PROBE: NOT DETECTED
ETEC LTA+ST1A+ST1B TOX ST NAA+NON-PROBE: NOT DETECTED
G LAMBLIA DNA SPEC QL NAA+PROBE: NOT DETECTED
LACTOFERRIN STL QL LA: NEGATIVE
NOROVIRUS GI+II RNA STL QL NAA+NON-PROBE: NOT DETECTED
P SHIGELLOIDES DNA STL QL NAA+PROBE: NOT DETECTED
RV RNA STL NAA+PROBE: NOT DETECTED
SALMONELLA DNA SPEC QL NAA+PROBE: NOT DETECTED
SAPO I+II+IV+V RNA STL QL NAA+NON-PROBE: NOT DETECTED
SHIGELLA SP+EIEC IPAH STL QL NAA+PROBE: NOT DETECTED
V CHOLERAE DNA SPEC QL NAA+PROBE: NOT DETECTED
VIBRIO DNA SPEC NAA+PROBE: NOT DETECTED
YERSINIA STL CULT: NOT DETECTED

## 2019-08-14 PROCEDURE — 87493 C DIFF AMPLIFIED PROBE: CPT

## 2019-08-14 PROCEDURE — 0097U HC BIOFIRE FILMARRAY GI PANEL: CPT | Performed by: FAMILY MEDICINE

## 2019-08-14 PROCEDURE — 83631 LACTOFERRIN FECAL (QUANT): CPT

## 2019-09-27 ENCOUNTER — TRANSCRIBE ORDERS (OUTPATIENT)
Dept: ADMINISTRATIVE | Facility: HOSPITAL | Age: 44
End: 2019-09-27

## 2019-09-27 DIAGNOSIS — R51.9 FACIAL PAIN: Primary | ICD-10-CM

## 2019-10-14 ENCOUNTER — HOSPITAL ENCOUNTER (EMERGENCY)
Facility: HOSPITAL | Age: 44
Discharge: HOME | End: 2019-10-14
Attending: EMERGENCY MEDICINE | Admitting: EMERGENCY MEDICINE

## 2019-10-14 ENCOUNTER — APPOINTMENT (OUTPATIENT)
Dept: GENERAL RADIOLOGY | Facility: HOSPITAL | Age: 44
End: 2019-10-14

## 2019-10-14 VITALS
RESPIRATION RATE: 18 BRPM | TEMPERATURE: 97.8 F | DIASTOLIC BLOOD PRESSURE: 80 MMHG | HEART RATE: 64 BPM | BODY MASS INDEX: 22.5 KG/M2 | OXYGEN SATURATION: 100 % | HEIGHT: 63 IN | WEIGHT: 127 LBS | SYSTOLIC BLOOD PRESSURE: 123 MMHG

## 2019-10-14 DIAGNOSIS — M54.41 ACUTE BILATERAL LOW BACK PAIN WITH RIGHT-SIDED SCIATICA: Primary | ICD-10-CM

## 2019-10-14 PROCEDURE — 96372 THER/PROPH/DIAG INJ SC/IM: CPT

## 2019-10-14 PROCEDURE — 99282 EMERGENCY DEPT VISIT SF MDM: CPT | Performed by: EMERGENCY MEDICINE

## 2019-10-14 PROCEDURE — 72100 X-RAY EXAM L-S SPINE 2/3 VWS: CPT

## 2019-10-14 PROCEDURE — 25010000002 KETOROLAC TROMETHAMINE PER 15 MG: Performed by: EMERGENCY MEDICINE

## 2019-10-14 PROCEDURE — 99283 EMERGENCY DEPT VISIT LOW MDM: CPT

## 2019-10-14 RX ORDER — METHYLPREDNISOLONE 4 MG/1
TABLET ORAL
Qty: 21 TABLET | Refills: 0 | OUTPATIENT
Start: 2019-10-14 | End: 2020-05-25

## 2019-10-14 RX ORDER — METHOCARBAMOL 500 MG/1
500 TABLET, FILM COATED ORAL 4 TIMES DAILY
Status: DISCONTINUED | OUTPATIENT
Start: 2019-10-14 | End: 2019-10-14 | Stop reason: HOSPADM

## 2019-10-14 RX ORDER — HYDROCODONE BITARTRATE AND ACETAMINOPHEN 5; 325 MG/1; MG/1
1 TABLET ORAL EVERY 8 HOURS PRN
Qty: 10 TABLET | Refills: 0 | Status: SHIPPED | OUTPATIENT
Start: 2019-10-14 | End: 2019-10-17

## 2019-10-14 RX ORDER — HYDROMORPHONE HCL 110MG/55ML
1 PATIENT CONTROLLED ANALGESIA SYRINGE INTRAVENOUS ONCE
Status: DISCONTINUED | OUTPATIENT
Start: 2019-10-14 | End: 2019-10-14 | Stop reason: HOSPADM

## 2019-10-14 RX ORDER — KETOROLAC TROMETHAMINE 30 MG/ML
30 INJECTION, SOLUTION INTRAMUSCULAR; INTRAVENOUS ONCE
Status: COMPLETED | OUTPATIENT
Start: 2019-10-14 | End: 2019-10-14

## 2019-10-14 RX ORDER — METHOCARBAMOL 500 MG/1
500 TABLET, FILM COATED ORAL 2 TIMES DAILY PRN
Qty: 15 TABLET | Refills: 0 | Status: SHIPPED | OUTPATIENT
Start: 2019-10-14 | End: 2019-10-21

## 2019-10-14 RX ADMIN — KETOROLAC TROMETHAMINE 30 MG: 30 INJECTION, SOLUTION INTRAMUSCULAR at 09:05

## 2019-10-14 NOTE — ED NOTES
This rn to bedside to discharge patient. Pt reports she does not want the dilaudid or robaxin as ordered by the MD prior to discharge. Pt reports she will get her RX filled as soon as she leaves here. Pt ambulatory with steady gait out of ED at this time.      Christen Bonilla, RN  10/14/19 1011

## 2019-10-14 NOTE — ED PROVIDER NOTES
Subjective   History of Present Illness  History of Present Illness    Chief complaint: Back pain    Location: Lower back, radiating down the right buttocks and leg    Quality/Severity: Severe sharp pain    Timing/Duration: Started yesterday, much worse this morning    Modifying Factors: Worse when walking or standing upright    Narrative: This patient presents for evaluation of new onset low back pain that is radiating down her right leg and buttocks area.  She denies any recent injuries or trauma to the back.  She says she has had a history of a slipped disc problem many years ago on her back that caused similar pain.  Last night the pain was beginning in her lower back so she took a muscle relaxer.  That did not relieve the pain completely but she was able to rest the night.  When she woke up this morning, the pain seemed to be much worse and now is limiting her ability to walk and move around.  She was, however able to drive herself here.  She denies any fevers.  She denies any incontinence or dysuria symptoms.    Associated Symptoms: As above    Review of Systems   Constitutional: Negative for activity change and fever.   Cardiovascular: Negative for chest pain.   Gastrointestinal: Negative for abdominal pain.   Genitourinary: Negative for dysuria, frequency and hematuria.   Musculoskeletal: Positive for back pain, gait problem (b/c of back pain) and myalgias.   Skin: Negative for color change and rash.   Neurological: Negative for syncope and numbness.   All other systems reviewed and are negative.      Past Medical History:   Diagnosis Date   • Anxiety    • Migraine        Allergies   Allergen Reactions   • Sulfamethoxazole-Trimethoprim Dizziness       Past Surgical History:   Procedure Laterality Date   • LEEP N/A 9/24/2018    Procedure: LOOP ELECTROCAUTERY EXCISION PROCEDURE BIOPSY OF CERVIX;  Surgeon: Sorin Bower MD;  Location: [facility];  Service: Obstetrics/Gynecology   • TUBAL ABDOMINAL  LIGATION     • TUBAL ABDOMINAL LIGATION         History reviewed. No pertinent family history.    Social History     Socioeconomic History   • Marital status: Single     Spouse name: Not on file   • Number of children: Not on file   • Years of education: Not on file   • Highest education level: Not on file   Tobacco Use   • Smoking status: Never Smoker   • Smokeless tobacco: Never Used   Substance and Sexual Activity   • Alcohol use: No   • Drug use: Yes     Types: Marijuana, Methamphetamines     Comment: DRUG FREE SINCE 2009  SMOKED sUN   • Sexual activity: Yes     Birth control/protection: None     Comment: TUBAL       ED Triage Vitals [10/14/19 0847]   Temp Heart Rate Resp BP SpO2   97.8 °F (36.6 °C) 64 18 123/80 100 %      Temp src Heart Rate Source Patient Position BP Location FiO2 (%)   -- -- -- -- --         Objective   Physical Exam   Constitutional: She is oriented to person, place, and time. She appears well-developed and well-nourished.   HENT:   Head: Normocephalic and atraumatic.   Eyes: EOM are normal. Pupils are equal, round, and reactive to light. Right eye exhibits no discharge. Left eye exhibits no discharge.   Neck: Normal range of motion. Neck supple.   Cardiovascular: Normal rate, regular rhythm and intact distal pulses.   Pulmonary/Chest: Effort normal. No respiratory distress.   Abdominal: Soft. There is no tenderness.   Musculoskeletal: She exhibits tenderness. She exhibits no edema or deformity.   Moderate diffuse tenderness throughout the lumbosacral back soft tissues, greatest around the L3, L4, L5, S1 region and extending bilaterally across the paravertebral sides.  Patient demonstrates pain with any movement of the back or body position changes.  Normal dorsiflexion of the toes is demonstrated.  Patient is able to ambulate up and down the hallway without assistance but with a slight limp to her gait.   Neurological: She is alert and oriented to person, place, and time. No sensory  "deficit. She exhibits normal muscle tone.   Skin: Skin is warm and dry. No rash noted. No erythema. No pallor.   Psychiatric: She has a normal mood and affect. Her behavior is normal. Judgment and thought content normal.   Nursing note and vitals reviewed.      RADIOLOGY        Study: X-ray lumbosacral spine    Findings: Mild thoracolumbar scoliosis. No acute abnormality in the  lumbar spine.    Interpreted contemporaneously with treatment by Dr. Alvarado, independently viewed by me    Procedures           ED Course  ED Course as of Oct 14 1207   Mon Oct 14, 2019   1206 I reviewed the x-ray results from this visit.  I believe the patient's presentation is most consistent with acute sciatica.  We will put her on a Medrol Dosepak and muscle relaxers.  Advise follow-up with her primary care doctor this week for further testing and management as needed.  Patient discharged home in good condition with the usual \"return to ER\" instructions for any worsening signs or symptoms.  [JAN]      ED Course User Index  [JAN] Radu Balderas MD                  MDM  Number of Diagnoses or Management Options  Acute bilateral low back pain with right-sided sciatica:      Amount and/or Complexity of Data Reviewed  Tests in the radiology section of CPT®: reviewed and ordered  Independent visualization of images, tracings, or specimens: yes    Risk of Complications, Morbidity, and/or Mortality  Presenting problems: moderate  Diagnostic procedures: low  Management options: moderate        Final diagnoses:   Acute bilateral low back pain with right-sided sciatica              Radu Balderas MD  10/14/19 1207    "

## 2019-10-14 NOTE — DISCHARGE INSTRUCTIONS
Rest as needed.  May apply heating pad to affected area.  Please return to the emergency room for any worsening pain, weakness, numbness, bowel or bladder incontinence or any other concerns.

## 2019-10-15 ENCOUNTER — HOSPITAL ENCOUNTER (OUTPATIENT)
Dept: CT IMAGING | Facility: HOSPITAL | Age: 44
Discharge: HOME | End: 2019-10-15
Admitting: OTOLARYNGOLOGY

## 2019-10-15 DIAGNOSIS — R51.9 FACIAL PAIN: ICD-10-CM

## 2019-10-15 PROCEDURE — 70486 CT MAXILLOFACIAL W/O DYE: CPT

## 2020-05-19 ENCOUNTER — OFFICE VISIT (OUTPATIENT)
Dept: GASTROENTEROLOGY | Facility: CLINIC | Age: 45
End: 2020-05-19

## 2020-05-19 ENCOUNTER — LAB (OUTPATIENT)
Dept: LAB | Facility: HOSPITAL | Age: 45
End: 2020-05-19

## 2020-05-19 VITALS — TEMPERATURE: 98.2 F | HEIGHT: 63 IN | BODY MASS INDEX: 23.28 KG/M2 | WEIGHT: 131.4 LBS

## 2020-05-19 DIAGNOSIS — R10.13 DYSPEPSIA: Primary | ICD-10-CM

## 2020-05-19 DIAGNOSIS — R10.13 DYSPEPSIA: ICD-10-CM

## 2020-05-19 DIAGNOSIS — Z83.71 FAMILY HISTORY OF POLYPS IN THE COLON: ICD-10-CM

## 2020-05-19 PROCEDURE — 36415 COLL VENOUS BLD VENIPUNCTURE: CPT

## 2020-05-19 PROCEDURE — 86677 HELICOBACTER PYLORI ANTIBODY: CPT

## 2020-05-19 PROCEDURE — 99204 OFFICE O/P NEW MOD 45 MIN: CPT | Performed by: INTERNAL MEDICINE

## 2020-05-19 RX ORDER — BENZONATATE 100 MG/1
CAPSULE ORAL
COMMUNITY
Start: 2020-03-26 | End: 2020-05-25

## 2020-05-19 RX ORDER — OMEPRAZOLE 20 MG/1
CAPSULE, DELAYED RELEASE ORAL
COMMUNITY
Start: 2020-04-28 | End: 2020-05-25

## 2020-05-19 RX ORDER — OMEPRAZOLE 40 MG/1
40 CAPSULE, DELAYED RELEASE ORAL DAILY
Qty: 90 CAPSULE | Refills: 3 | Status: SHIPPED | OUTPATIENT
Start: 2020-05-19 | End: 2020-07-01 | Stop reason: SDUPTHER

## 2020-05-19 RX ORDER — FAMOTIDINE 40 MG/1
TABLET, FILM COATED ORAL
COMMUNITY
Start: 2020-05-19 | End: 2020-05-25

## 2020-05-19 RX ORDER — SUMATRIPTAN 50 MG/1
TABLET, FILM COATED ORAL
COMMUNITY
Start: 2020-03-26 | End: 2020-10-07

## 2020-05-19 NOTE — PROGRESS NOTES
"    PATIENT INFORMATION  Luanne Sun       - 1975    CHIEF COMPLAINT  Chief Complaint   Patient presents with   • Abdominal Pain   • Constipation   • Diarrhea       HISTORY OF PRESENT ILLNESS  Has been seen for IBS - D and was doing well up until a couple of years ago with postprandial epigastric pain and no vomiting and still with altenating IBS    Will skip 2-3 days an then go daily for 2-3 days and the skip again.    Has pepcid for a month and then strated Prilosec just two weeks ago. Pain was less on the pepcid but still daily discomfort. No apparent benefit from the PPI.    Recent bronchits received steroids and Abx I \"mid April:\"    Has daily Headaches and takes BC powder  So enocuraged to see a Neurologis          REVIEW OF SYSTEMS  Review of Systems   Gastrointestinal: Positive for abdominal distention, abdominal pain, constipation and diarrhea.   All other systems reviewed and are negative.        ACTIVE PROBLEMS  Patient Active Problem List    Diagnosis   • HGSIL (high grade squamous intraepithelial lesion) on Pap smear of cervix [R87.613]         PAST MEDICAL HISTORY  Past Medical History:   Diagnosis Date   • Anxiety    • Migraine          SURGICAL HISTORY  Past Surgical History:   Procedure Laterality Date   • LEEP N/A 2018    Procedure: LOOP ELECTROCAUTERY EXCISION PROCEDURE BIOPSY OF CERVIX;  Surgeon: Sorin Bower MD;  Location: [facility];  Service: Obstetrics/Gynecology   • TUBAL ABDOMINAL LIGATION     • TUBAL ABDOMINAL LIGATION           FAMILY HISTORY  Family History   Problem Relation Age of Onset   • Colon polyps Mother    • Colon cancer Paternal Aunt    • Colon polyps Paternal Aunt          SOCIAL HISTORY  Social History     Occupational History   • Not on file   Tobacco Use   • Smoking status: Never Smoker   • Smokeless tobacco: Never Used   Substance and Sexual Activity   • Alcohol use: No   • Drug use: Yes     Types: Marijuana, Methamphetamines     Comment: DRUG " "FREE SINCE 2009  SMOKED sUN   • Sexual activity: Yes     Birth control/protection: None     Comment: TUBAL         CURRENT MEDICATIONS    Current Outpatient Medications:   •  Aspirin-Salicylamide-Caffeine (BC HEADACHE POWDER PO), Take 1 packet by mouth As Needed., Disp: , Rfl:   •  benzonatate (TESSALON) 100 MG capsule, TK 1 C PO QID PRN, Disp: , Rfl:   •  famotidine (PEPCID) 40 MG tablet, , Disp: , Rfl:   •  methylPREDNISolone (MEDROL, [place],) 4 MG tablet, Take as directed on package instructions., Disp: 21 tablet, Rfl: 0  •  Multiple Vitamins-Minerals (ADULT GUMMY PO), Take 1 tablet by mouth Daily., Disp: , Rfl:   •  omeprazole (priLOSEC) 20 MG capsule, TK 1 C PO D, Disp: , Rfl:   •  omeprazole (priLOSEC) 40 MG capsule, Take 1 capsule by mouth Daily., Disp: 90 capsule, Rfl: 3  •  SUMAtriptan (IMITREX) 50 MG tablet, TK 1 T PO AT ONSET OF HEADACHE AND MAY REPEAT IN 1 HOUR, Disp: , Rfl:     ALLERGIES  Sulfamethoxazole-trimethoprim    VITALS  Vitals:    05/19/20 1529   Temp: 98.2 °F (36.8 °C)   TempSrc: Temporal   Weight: 59.6 kg (131 lb 6.4 oz)   Height: 160 cm (62.99\")       LAST RESULTS   Lab on 08/14/2019   Component Date Value Ref Range Status   • Lactoferrin, Qual 08/14/2019 Negative  Negative Final   • C. Difficile Toxins by PCR 08/14/2019 Negative  Negative Final     No results found.    PHYSICAL EXAM  Debilities/Disabilities Identified: None  Emotional Behavior: Appropriate  Physical Exam   Constitutional: She is oriented to person, place, and time. She appears well-developed and well-nourished.   Eyes: Pupils are equal, round, and reactive to light. Conjunctivae and EOM are normal. No scleral icterus.   Neck: Normal range of motion. Neck supple. No thyromegaly present.   Cardiovascular: Normal rate, regular rhythm, normal heart sounds and intact distal pulses. Exam reveals no gallop.   No murmur heard.  Pulmonary/Chest: Effort normal and breath sounds normal. She has no wheezes. She has no rales.   Abdominal: " Soft. Bowel sounds are normal. She exhibits no shifting dullness, no distension, no fluid wave, no abdominal bruit, no ascites and no mass. There is no hepatosplenomegaly. There is tenderness in the right upper quadrant, epigastric area and left upper quadrant. There is no guarding and negative Arceo's sign. Hernia confirmed negative in the ventral area.   Musculoskeletal: Normal range of motion. She exhibits no edema.   Lymphadenopathy:     She has no cervical adenopathy.   Neurological: She is alert and oriented to person, place, and time.   Skin: Skin is warm and dry. No rash noted. She is not diaphoretic. No erythema.       ASSESSMENT  Diagnoses and all orders for this visit:    Dyspepsia  -     Helicobacter Pylori, IgA IgG IgM; Future    Family history of polyps in the colon    Other orders  -     famotidine (PEPCID) 40 MG tablet  -     omeprazole (priLOSEC) 20 MG capsule; TK 1 C PO D  -     SUMAtriptan (IMITREX) 50 MG tablet; TK 1 T PO AT ONSET OF HEADACHE AND MAY REPEAT IN 1 HOUR  -     benzonatate (TESSALON) 100 MG capsule; TK 1 C PO QID PRN  -     omeprazole (priLOSEC) 40 MG capsule; Take 1 capsule by mouth Daily.          PLAN  Will need colon this year as well  Will discuss EGD bvased on her response to meds  Return in about 2 months (around 7/19/2020).    I have discussed the above plan with the patient.  They verbalize understanding and are in agreement with the plan.  They have been advised to contact the office for any questions, concerns, or changes related to their health.

## 2020-05-20 LAB
H PYLORI IGA SER IA-ACNC: <9 UNITS (ref 0–8.9)
H PYLORI IGG SER IA-ACNC: 0.14 INDEX VALUE (ref 0–0.79)
H PYLORI IGM SER-ACNC: <9 UNITS (ref 0–8.9)

## 2020-05-25 ENCOUNTER — HOSPITAL ENCOUNTER (EMERGENCY)
Facility: HOSPITAL | Age: 45
Discharge: HOME | End: 2020-05-25
Attending: EMERGENCY MEDICINE | Admitting: EMERGENCY MEDICINE

## 2020-05-25 VITALS
OXYGEN SATURATION: 100 % | TEMPERATURE: 97.9 F | SYSTOLIC BLOOD PRESSURE: 112 MMHG | WEIGHT: 132 LBS | DIASTOLIC BLOOD PRESSURE: 79 MMHG | HEIGHT: 64 IN | BODY MASS INDEX: 22.53 KG/M2 | HEART RATE: 72 BPM | RESPIRATION RATE: 20 BRPM

## 2020-05-25 DIAGNOSIS — M54.2 NECK PAIN, MUSCULOSKELETAL: Primary | ICD-10-CM

## 2020-05-25 PROCEDURE — 99282 EMERGENCY DEPT VISIT SF MDM: CPT | Performed by: EMERGENCY MEDICINE

## 2020-05-25 PROCEDURE — 99282 EMERGENCY DEPT VISIT SF MDM: CPT

## 2020-05-25 RX ORDER — CYCLOBENZAPRINE HCL 10 MG
10 TABLET ORAL 2 TIMES DAILY PRN
Qty: 10 TABLET | Refills: 0 | Status: ON HOLD | OUTPATIENT
Start: 2020-05-25 | End: 2020-07-29

## 2020-05-25 NOTE — DISCHARGE INSTRUCTIONS
Rest and apply heating pad to affected area as needed.  May take the muscle relaxers as prescribed but do not drive or operate any vehicles while taking those medications.  Must follow-up with your family doctor this week if not improving.  Please return to the emergency room for any worsening pain, fevers, weakness, numbness or any other concerns.

## 2020-05-25 NOTE — ED PROVIDER NOTES
Subjective   History of Present Illness  History of Present Illness    Chief complaint: Neck pain    Location: Left-sided neck and upper back    Quality/Severity: Moderate aching pain, soreness    Timing/Duration: Present for 2 days    Modifying Factors: Worse with turning head to the right or looking upwards    Narrative: This patient presents for evaluation of pain in the left neck area.  She denies any recent injuries to that area.  She works at [employer].  She says when she woke up on Saturday morning she had this pain in the left side of her neck.  She has tried massaging the area with a tennis ball.  She has tried taking some anti-inflammatories.  However her pain still persists.  She denies any fevers or numbness or weakness symptoms.  She is never had any cervical spine surgery before.  She felt that she could not go to work today because of the pain and is requesting a work excuse paper.    Associated Symptoms: As above    Review of Systems   Constitutional: Negative for activity change and fever.   Respiratory: Negative for shortness of breath.    Cardiovascular: Negative for chest pain.   Gastrointestinal: Negative for abdominal pain.   Musculoskeletal: Positive for myalgias and neck pain. Negative for joint swelling.   Skin: Negative for color change, rash and wound.   Neurological: Negative for syncope, weakness and numbness.   All other systems reviewed and are negative.      Past Medical History:   Diagnosis Date   • Anxiety    • Migraine        Allergies   Allergen Reactions   • Sulfamethoxazole-Trimethoprim Dizziness and Rash     dizziness       Past Surgical History:   Procedure Laterality Date   • LEEP N/A 9/24/2018    Procedure: LOOP ELECTROCAUTERY EXCISION PROCEDURE BIOPSY OF CERVIX;  Surgeon: Sorin Bower MD;  Location: [facility];  Service: Obstetrics/Gynecology   • TUBAL ABDOMINAL LIGATION     • TUBAL ABDOMINAL LIGATION         Family History   Problem Relation Age of Onset   •  Colon polyps Mother    • Colon cancer Paternal Aunt    • Colon polyps Paternal Aunt        Social History     Socioeconomic History   • Marital status: Single     Spouse name: Not on file   • Number of children: Not on file   • Years of education: Not on file   • Highest education level: Not on file   Tobacco Use   • Smoking status: Never Smoker   • Smokeless tobacco: Never Used   Substance and Sexual Activity   • Alcohol use: No   • Drug use: Not Currently     Types: Marijuana, Methamphetamines     Comment: DRUG FREE SINCE 2009  SMOKED sUN   • Sexual activity: Yes     Birth control/protection: None     Comment: TUBAL       ED Triage Vitals [05/25/20 1033]   Temp Heart Rate Resp BP SpO2   97.9 °F (36.6 °C) 72 20 112/79 100 %      Temp src Heart Rate Source Patient Position BP Location FiO2 (%)   Oral Monitor Sitting Right arm --     Objective   Physical Exam   Constitutional: She is oriented to person, place, and time. She appears well-developed and well-nourished. No distress.   HENT:   Head: Normocephalic and atraumatic.   Eyes: Pupils are equal, round, and reactive to light. EOM are normal. Right eye exhibits no discharge. Left eye exhibits no discharge.   Neck: Normal range of motion. Neck supple.   Cardiovascular: Normal rate, regular rhythm and intact distal pulses.   Pulmonary/Chest: Effort normal. No respiratory distress.   Musculoskeletal: She exhibits tenderness. She exhibits no edema or deformity.   Mild tenderness to palpation along the left paravertebral and left posterior lateral soft tissues of the neck and upper thoracic back region.  No bony midline focal tenderness to the vertebral processes is evident at all.  No step-offs or deformities palpable.  Patient is able to flex her neck downward and complete range of motion.  However she resists extension of the neck upward and rotation towards the right because of pain with movement in those directions.   Neurological: She is alert and oriented to  "person, place, and time. No sensory deficit. She exhibits normal muscle tone.   Skin: Skin is warm and dry. No rash noted. She is not diaphoretic. No erythema.   Psychiatric: She has a normal mood and affect. Her behavior is normal. Judgment and thought content normal.   Nursing note and vitals reviewed.      Procedures           ED Course  ED Course as of May 25 1521   Mon May 25, 2020   1520 Patient presented for non-traumatic neck pain problem.  Examination was non-worrisome.  It seems to be entirely muscular in origin to me.  Will treat with muscle relaxers and advised heating pad to affected area.  Work note provided for her at her request.  Discharged home in good condition with usual \"return to ER\" instructions for any worsening signs or symptoms.    [JAN]      ED Course User Index  [JAN] Radu Balderas MD                                           [facility]    Final diagnoses:   Neck pain, musculoskeletal            Radu Balderas MD  05/25/20 1521    "

## 2020-07-01 ENCOUNTER — PREP FOR SURGERY (OUTPATIENT)
Dept: OTHER | Facility: HOSPITAL | Age: 45
End: 2020-07-01

## 2020-07-01 ENCOUNTER — OFFICE VISIT (OUTPATIENT)
Dept: GASTROENTEROLOGY | Facility: CLINIC | Age: 45
End: 2020-07-01

## 2020-07-01 VITALS — HEIGHT: 64 IN | TEMPERATURE: 98.7 F | BODY MASS INDEX: 21.48 KG/M2 | WEIGHT: 125.8 LBS

## 2020-07-01 DIAGNOSIS — K58.2 IRRITABLE BOWEL SYNDROME WITH BOTH CONSTIPATION AND DIARRHEA: ICD-10-CM

## 2020-07-01 DIAGNOSIS — Z12.11 ENCOUNTER FOR SCREENING FOR MALIGNANT NEOPLASM OF COLON: Primary | ICD-10-CM

## 2020-07-01 DIAGNOSIS — R10.9 ABDOMINAL PAIN: ICD-10-CM

## 2020-07-01 DIAGNOSIS — R12 HEARTBURN: ICD-10-CM

## 2020-07-01 DIAGNOSIS — R10.9 CHRONIC ABDOMINAL PAIN: ICD-10-CM

## 2020-07-01 DIAGNOSIS — K21.9 GERD WITHOUT ESOPHAGITIS: ICD-10-CM

## 2020-07-01 DIAGNOSIS — K21.9 GERD WITHOUT ESOPHAGITIS: Primary | ICD-10-CM

## 2020-07-01 DIAGNOSIS — G89.29 CHRONIC ABDOMINAL PAIN: ICD-10-CM

## 2020-07-01 PROCEDURE — 99214 OFFICE O/P EST MOD 30 MIN: CPT | Performed by: NURSE PRACTITIONER

## 2020-07-01 RX ORDER — OMEPRAZOLE 40 MG/1
40 CAPSULE, DELAYED RELEASE ORAL 2 TIMES DAILY
Qty: 180 CAPSULE | Refills: 3 | Status: SHIPPED | OUTPATIENT
Start: 2020-07-01 | End: 2021-04-26 | Stop reason: SDUPTHER

## 2020-07-01 NOTE — PROGRESS NOTES
"PATIENT INFORMATION  Luanne Sun     - 1975    CHIEF COMPLAINT  Chief Complaint   Patient presents with   • Follow-up     follow up on Dyspepsia       HISTORY OF PRESENT ILLNESS  (Saw Dr. Oviedo in mid May: Has been seen for IBS - D and was doing well up until a couple of years ago with postprandial epigastric pain and no vomiting and still with altenating IBS     Will skip 2-3 days an then go daily for 2-3 days and the skip again.     Has pepcid for a month and then strated Prilosec just two weeks ago. Pain was less on the pepcid but still daily discomfort. No apparent benefit from the PPI.     Recent bronchits received steroids and Abx I \"mid April:\"     Has daily Headaches and takes BC powder  So enocuraged to see a NeurologisPLAN  Will need colon this year as well  Will discuss EGD bvased on her response to meds  Return in about 2 months (around 2020).)    HP was negative in May and has been taking Omeprazole 40mg po daily since then. Has been doing Pepcid at bedtime and with dinner.     Tuesday morning abd pain and cramping with mucous in stool.  Cramping is worse in the AM but is only having one BM a day.  No wt loss/gain since last appt with Dr. Oviedo.  Has had to go home from work twice this week d/t abd pain and cramping.           REVIEW OF SYSTEMS  ROS: 14 point review of systems was performed and all other systems were reviewed and are negative except for documented findings in HPI and today's encounter.   Review of Systems      ACTIVE PROBLEMS  Patient Active Problem List    Diagnosis   • GERD without esophagitis [K21.9]   • HGSIL (high grade squamous intraepithelial lesion) on Pap smear of cervix [R87.613]         PAST MEDICAL HISTORY  Past Medical History:   Diagnosis Date   • Anxiety    • Migraine          SURGICAL HISTORY  Past Surgical History:   Procedure Laterality Date   • COLONOSCOPY     • LEEP N/A 2018    Procedure: LOOP ELECTROCAUTERY EXCISION PROCEDURE BIOPSY OF " CERVIX;  Surgeon: Sorin Bower MD;  Location: [facility];  Service: Obstetrics/Gynecology   • TUBAL ABDOMINAL LIGATION     • TUBAL ABDOMINAL LIGATION           FAMILY HISTORY  Family History   Problem Relation Age of Onset   • Colon polyps Mother    • Colon cancer Paternal Aunt    • Colon polyps Paternal Aunt          SOCIAL HISTORY  Social History     Occupational History   • Not on file   Tobacco Use   • Smoking status: Never Smoker   • Smokeless tobacco: Never Used   Substance and Sexual Activity   • Alcohol use: No   • Drug use: Not Currently     Types: Marijuana, Methamphetamines     Comment: DRUG FREE SINCE 2009  SMOKED sUN   • Sexual activity: Yes     Birth control/protection: None     Comment: TUBAL         CURRENT MEDICATIONS    Current Outpatient Medications:   •  cyclobenzaprine (FLEXERIL) 10 MG tablet, Take 1 tablet by mouth 2 (Two) Times a Day As Needed for Muscle Spasms., Disp: 10 tablet, Rfl: 0  •  Multiple Vitamins-Minerals (ADULT GUMMY PO), Take 1 tablet by mouth Daily., Disp: , Rfl:   •  omeprazole (priLOSEC) 40 MG capsule, Take 1 capsule by mouth 2 (two) times a day., Disp: 180 capsule, Rfl: 3  •  SUMAtriptan (IMITREX) 50 MG tablet, TK 1 T PO AT ONSET OF HEADACHE AND MAY REPEAT IN 1 HOUR, Disp: , Rfl:     ALLERGIES  Sulfamethoxazole-trimethoprim    LAST RESULTS   Lab on 05/19/2020   Component Date Value Ref Range Status   • H. pylori IgG 05/19/2020 0.14  0.00 - 0.79 Index Value Final                                 Negative           <0.80                               Equivocal    0.80 - 0.89                               Positive           >0.89   • H. pylori, IgA ABS 05/19/2020 <9.0  0.0 - 8.9 units Final                                    Negative          <9.0                                  Equivocal   9.0 - 11.0                                  Positive         >11.0   • H. Pylori, IgM 05/19/2020 <9.0  0.0 - 8.9 units Final                                    Negative           "<9.0                                  Equivocal   9.0 - 11.0                                  Positive         >11.0  This test was developed and its performance characteristics  determined by [employer]. It has not been cleared or approved  by the Food and Drug Administration.     No results found.    PHYSICAL EXAM    44 y.o. female  Wt Readings from Last 3 Encounters:   07/01/20 57.1 kg (125 lb 12.8 oz)   05/25/20 59.9 kg (132 lb)   05/19/20 59.6 kg (131 lb 6.4 oz)     Ht Readings from Last 1 Encounters:   07/01/20 162.6 cm (64.02\")     Body mass index is 21.58 kg/m².  Vitals:    07/01/20 1428   Temp: 98.7 °F (37.1 °C)   TempSrc: Temporal   Weight: 57.1 kg (125 lb 12.8 oz)   Height: 162.6 cm (64.02\")     Vital signs reviewed.          Physical Exam   Constitutional: She is oriented to person, place, and time. She appears well-developed and well-nourished.   HENT:   Head: Normocephalic and atraumatic.   Eyes: Pupils are equal, round, and reactive to light. Conjunctivae are normal.   Neck: Normal range of motion. Neck supple.   Cardiovascular: Normal rate and regular rhythm.   Pulmonary/Chest: Effort normal and breath sounds normal.   Abdominal: Soft. Bowel sounds are normal. She exhibits no distension. There is tenderness in the epigastric area.   Musculoskeletal: Normal range of motion.   Lymphadenopathy:     She has no cervical adenopathy.   Neurological: She is alert and oriented to person, place, and time.   Skin: Skin is warm and dry.   Psychiatric: She has a normal mood and affect. Her behavior is normal.   Nursing note and vitals reviewed.        ASSESSMENT  Diagnoses and all orders for this visit:    GERD without esophagitis  -     omeprazole (priLOSEC) 40 MG capsule; Take 1 capsule by mouth 2 (two) times a day.    Heartburn    Chronic abdominal pain    Irritable bowel syndrome with both constipation and diarrhea        CLINICAL DATA REVIEWED   reviewed previous lab results and integrated with today's visit, " reviewed notes from other physicians and/or last GI encounter, reviewed previous endoscopy results and available photos    PLAN  Return in about 2 months (around 9/1/2020).    Increase omeprazole to bid, start on daily probiotic, increase fiber in diet and drink lots of water,  Schedule for EGD and colonoscopy with biopsies.     I have discussed the above plan with the patient.  They verbalize understanding and are in agreement with the plan.  They have been advised to contact the office for any questions, concerns, or changes related to their health.

## 2020-07-09 ENCOUNTER — TELEPHONE (OUTPATIENT)
Dept: GASTROENTEROLOGY | Facility: CLINIC | Age: 45
End: 2020-07-09

## 2020-07-09 NOTE — TELEPHONE ENCOUNTER
CALLED PATIENT TO MOVE HER PROCEDURES UP BECAUSE OF A CANCELLATION.  STILL AT [place].  MOVED TO 07/29/2020 AT 8:45AM - ARRIVE 7:30AM. E-MAIL INSTRUCTIONS [email or web address] TODAY.

## 2020-07-23 ENCOUNTER — TRANSCRIBE ORDERS (OUTPATIENT)
Dept: ADMINISTRATIVE | Facility: HOSPITAL | Age: 45
End: 2020-07-23

## 2020-07-23 DIAGNOSIS — Z01.818 OTHER SPECIFIED PRE-OPERATIVE EXAMINATION: Primary | ICD-10-CM

## 2020-07-27 ENCOUNTER — LAB (OUTPATIENT)
Dept: LAB | Facility: HOSPITAL | Age: 45
End: 2020-07-27

## 2020-07-27 DIAGNOSIS — Z01.818 OTHER SPECIFIED PRE-OPERATIVE EXAMINATION: ICD-10-CM

## 2020-07-27 PROCEDURE — U0002 COVID-19 LAB TEST NON-CDC: HCPCS

## 2020-07-27 PROCEDURE — U0004 COV-19 TEST NON-CDC HGH THRU: HCPCS

## 2020-07-27 PROCEDURE — C9803 HOPD COVID-19 SPEC COLLECT: HCPCS

## 2020-07-28 ENCOUNTER — ANESTHESIA EVENT (OUTPATIENT)
Dept: PERIOP | Facility: HOSPITAL | Age: 45
End: 2020-07-28

## 2020-07-28 LAB
REF LAB TEST METHOD: NORMAL
SARS-COV-2 RNA RESP QL NAA+PROBE: NOT DETECTED

## 2020-07-29 ENCOUNTER — HOSPITAL ENCOUNTER (OUTPATIENT)
Facility: HOSPITAL | Age: 45
Setting detail: HOSPITAL OUTPATIENT SURGERY
Discharge: HOME | End: 2020-07-29
Attending: INTERNAL MEDICINE | Admitting: INTERNAL MEDICINE

## 2020-07-29 ENCOUNTER — ANESTHESIA (OUTPATIENT)
Dept: PERIOP | Facility: HOSPITAL | Age: 45
End: 2020-07-29

## 2020-07-29 VITALS
DIASTOLIC BLOOD PRESSURE: 65 MMHG | SYSTOLIC BLOOD PRESSURE: 116 MMHG | BODY MASS INDEX: 21.17 KG/M2 | OXYGEN SATURATION: 97 % | WEIGHT: 123.4 LBS | TEMPERATURE: 97.4 F | RESPIRATION RATE: 14 BRPM | HEART RATE: 66 BPM

## 2020-07-29 DIAGNOSIS — K21.9 GERD WITHOUT ESOPHAGITIS: ICD-10-CM

## 2020-07-29 DIAGNOSIS — R10.9 ABDOMINAL PAIN: ICD-10-CM

## 2020-07-29 DIAGNOSIS — Z12.11 ENCOUNTER FOR SCREENING FOR MALIGNANT NEOPLASM OF COLON: ICD-10-CM

## 2020-07-29 PROCEDURE — 88305 TISSUE EXAM BY PATHOLOGIST: CPT | Performed by: INTERNAL MEDICINE

## 2020-07-29 PROCEDURE — 25010000002 PROPOFOL 10 MG/ML EMULSION: Performed by: NURSE ANESTHETIST, CERTIFIED REGISTERED

## 2020-07-29 PROCEDURE — 43239 EGD BIOPSY SINGLE/MULTIPLE: CPT | Performed by: INTERNAL MEDICINE

## 2020-07-29 PROCEDURE — 45378 DIAGNOSTIC COLONOSCOPY: CPT | Performed by: INTERNAL MEDICINE

## 2020-07-29 RX ORDER — PROPOFOL 10 MG/ML
VIAL (ML) INTRAVENOUS AS NEEDED
Status: DISCONTINUED | OUTPATIENT
Start: 2020-07-29 | End: 2020-07-29 | Stop reason: SURG

## 2020-07-29 RX ORDER — ONDANSETRON 2 MG/ML
4 INJECTION INTRAMUSCULAR; INTRAVENOUS ONCE AS NEEDED
Status: ACTIVE | OUTPATIENT
Start: 2020-07-29

## 2020-07-29 RX ORDER — SODIUM CHLORIDE 9 MG/ML
40 INJECTION, SOLUTION INTRAVENOUS AS NEEDED
Status: DISCONTINUED | OUTPATIENT
Start: 2020-07-29 | End: 2020-07-29 | Stop reason: HOSPADM

## 2020-07-29 RX ORDER — LIDOCAINE HYDROCHLORIDE 20 MG/ML
INJECTION, SOLUTION INFILTRATION; PERINEURAL AS NEEDED
Status: DISCONTINUED | OUTPATIENT
Start: 2020-07-29 | End: 2020-07-29 | Stop reason: SURG

## 2020-07-29 RX ORDER — SODIUM CHLORIDE 0.9 % (FLUSH) 0.9 %
10 SYRINGE (ML) INJECTION EVERY 12 HOURS SCHEDULED
Status: DISCONTINUED | OUTPATIENT
Start: 2020-07-29 | End: 2020-07-29 | Stop reason: HOSPADM

## 2020-07-29 RX ORDER — LIDOCAINE HYDROCHLORIDE 10 MG/ML
0.5 INJECTION, SOLUTION EPIDURAL; INFILTRATION; INTRACAUDAL; PERINEURAL ONCE AS NEEDED
Status: COMPLETED | OUTPATIENT
Start: 2020-07-29 | End: 2020-07-29

## 2020-07-29 RX ORDER — SODIUM CHLORIDE, SODIUM LACTATE, POTASSIUM CHLORIDE, CALCIUM CHLORIDE 600; 310; 30; 20 MG/100ML; MG/100ML; MG/100ML; MG/100ML
9 INJECTION, SOLUTION INTRAVENOUS CONTINUOUS
Status: DISCONTINUED | OUTPATIENT
Start: 2020-07-29 | End: 2020-07-29 | Stop reason: HOSPADM

## 2020-07-29 RX ORDER — SODIUM CHLORIDE, SODIUM LACTATE, POTASSIUM CHLORIDE, CALCIUM CHLORIDE 600; 310; 30; 20 MG/100ML; MG/100ML; MG/100ML; MG/100ML
100 INJECTION, SOLUTION INTRAVENOUS CONTINUOUS
Status: ACTIVE | OUTPATIENT
Start: 2020-07-29

## 2020-07-29 RX ORDER — SODIUM CHLORIDE 0.9 % (FLUSH) 0.9 %
10 SYRINGE (ML) INJECTION AS NEEDED
Status: DISCONTINUED | OUTPATIENT
Start: 2020-07-29 | End: 2020-07-29 | Stop reason: HOSPADM

## 2020-07-29 RX ADMIN — PROPOFOL 50 MG: 10 INJECTION, EMULSION INTRAVENOUS at 09:04

## 2020-07-29 RX ADMIN — LIDOCAINE HYDROCHLORIDE 100 MG: 20 INJECTION, SOLUTION INFILTRATION; PERINEURAL at 08:59

## 2020-07-29 RX ADMIN — LIDOCAINE HYDROCHLORIDE 0.1 ML: 10 INJECTION, SOLUTION EPIDURAL; INFILTRATION; INTRACAUDAL; PERINEURAL at 08:18

## 2020-07-29 RX ADMIN — PROPOFOL 40 MG: 10 INJECTION, EMULSION INTRAVENOUS at 09:13

## 2020-07-29 RX ADMIN — SODIUM CHLORIDE, POTASSIUM CHLORIDE, SODIUM LACTATE AND CALCIUM CHLORIDE 9 ML/HR: 600; 310; 30; 20 INJECTION, SOLUTION INTRAVENOUS at 08:18

## 2020-07-29 RX ADMIN — PROPOFOL 50 MG: 10 INJECTION, EMULSION INTRAVENOUS at 08:59

## 2020-07-29 RX ADMIN — PROPOFOL 40 MG: 10 INJECTION, EMULSION INTRAVENOUS at 09:21

## 2020-07-29 RX ADMIN — PROPOFOL 50 MG: 10 INJECTION, EMULSION INTRAVENOUS at 09:07

## 2020-07-29 RX ADMIN — PROPOFOL 40 MG: 10 INJECTION, EMULSION INTRAVENOUS at 09:16

## 2020-07-29 RX ADMIN — PROPOFOL 50 MG: 10 INJECTION, EMULSION INTRAVENOUS at 09:01

## 2020-07-29 RX ADMIN — PROPOFOL 40 MG: 10 INJECTION, EMULSION INTRAVENOUS at 09:18

## 2020-07-29 RX ADMIN — PROPOFOL 40 MG: 10 INJECTION, EMULSION INTRAVENOUS at 09:09

## 2020-07-29 NOTE — ANESTHESIA PREPROCEDURE EVALUATION
Anesthesia Evaluation     Patient summary reviewed and Nursing notes reviewed   no history of anesthetic complications:  NPO Solid Status: > 8 hours  NPO Liquid Status: > 8 hours           Airway   Mallampati: II  TM distance: <3 FB  Neck ROM: full  No difficulty expected  Dental    (+) partials        Pulmonary - normal exam   (+) a smoker,   Cardiovascular - normal exam  Exercise tolerance: good (4-7 METS)    ECG reviewed  Rhythm: regular  Rate: normal        Neuro/Psych  (+) headaches, psychiatric history Depression,     GI/Hepatic/Renal/Endo - negative ROS   GERD: denies GERD symptoms, on prilosec     Musculoskeletal (-) negative ROS    Abdominal  - normal exam   Substance History   (+) drug use (2x a day marijuana use)     OB/GYN negative ob/gyn ROS         Other                        Anesthesia Plan    ASA 2     MAC     intravenous induction     Anesthetic plan, all risks, benefits, and alternatives have been provided, discussed and informed consent has been obtained with: patient.  Use of blood products discussed with patient  Consented to blood products.

## 2020-07-29 NOTE — ANESTHESIA POSTPROCEDURE EVALUATION
Patient: Luanne Sun    Procedure Summary     Date:  07/29/20 Room / Location:  [facility] ENDOSCOPY 1 /  [facility]    Anesthesia Start:  0857 Anesthesia Stop:  0926    Procedures:       ESOPHAGOGASTRODUODENOSCOPY with biopsies (N/A Esophagus)      COLONOSCOPY (N/A ) Diagnosis:       Encounter for screening for malignant neoplasm of colon      GERD without esophagitis      Abdominal pain      Erosive esophagitis      Gastritis      (Encounter for screening for malignant neoplasm of colon [Z12.11])      (GERD without esophagitis [K21.9])      (Abdominal pain [R10.9])    Surgeon:  Osmin Oviedo MD Provider:  Joanne Jefferson CRNA    Anesthesia Type:  MAC ASA Status:  2          Anesthesia Type: MAC    Vitals  Vitals Value Taken Time   BP 92/58 7/29/2020  9:28 AM   Temp 97.4 °F (36.3 °C) 7/29/2020  9:28 AM   Pulse 62 7/29/2020  9:28 AM   Resp 14 7/29/2020  9:28 AM   SpO2 99 % 7/29/2020  9:28 AM           Post Anesthesia Care and Evaluation    Patient location during evaluation: bedside  Patient participation: complete - patient participated  Level of consciousness: awake and alert  Pain score: 0  Pain management: adequate  Airway patency: patent  Anesthetic complications: No anesthetic complications  PONV Status: none  Cardiovascular status: acceptable  Respiratory status: acceptable  Hydration status: acceptable

## 2020-07-30 LAB
CYTO UR: NORMAL
LAB AP CASE REPORT: NORMAL
PATH REPORT.FINAL DX SPEC: NORMAL
PATH REPORT.GROSS SPEC: NORMAL

## 2020-08-03 ENCOUNTER — OFFICE VISIT (OUTPATIENT)
Dept: GASTROENTEROLOGY | Facility: CLINIC | Age: 45
End: 2020-08-03

## 2020-08-03 VITALS — TEMPERATURE: 98.2 F | HEIGHT: 64 IN | BODY MASS INDEX: 21.78 KG/M2 | WEIGHT: 127.6 LBS

## 2020-08-03 DIAGNOSIS — K21.00 GERD WITH ESOPHAGITIS: Primary | ICD-10-CM

## 2020-08-03 PROCEDURE — 99213 OFFICE O/P EST LOW 20 MIN: CPT | Performed by: NURSE PRACTITIONER

## 2020-08-03 NOTE — PROGRESS NOTES
"    PATIENT INFORMATION  Luanne Sun       - 1975    CHIEF COMPLAINT  Chief Complaint   Patient presents with   • Follow-up     2 mo follow up Gerd; IBS       HISTORY OF PRESENT ILLNESS  EGD and C/S 20.  IBScombined.  Last visit: Increase omeprazole 40mg to bid, start on daily probiotic, increase fiber in diet and drink lots of water  TODAY:  Mild gastitis and esophagitis, normal colon with 5 yr recall for fam hx.     Her IBS is balanced at this point and not having diarrhea or constipation and improved in gastric \"burning\" since increasing PPI to BID.            REVIEW OF SYSTEMS  Review of Systems   All other systems reviewed and are negative.        ACTIVE PROBLEMS  Patient Active Problem List    Diagnosis   • GERD with esophagitis [K21.0]   • GERD without esophagitis [K21.9]   • Encounter for screening for malignant neoplasm of colon [Z12.11]   • Abdominal pain [R10.9]   • HGSIL (high grade squamous intraepithelial lesion) on Pap smear of cervix [R87.613]         PAST MEDICAL HISTORY  Past Medical History:   Diagnosis Date   • Anxiety    • Migraine          SURGICAL HISTORY  Past Surgical History:   Procedure Laterality Date   • COLONOSCOPY     • COLONOSCOPY N/A 2020    Procedure: COLONOSCOPY;  Surgeon: Osmin Oviedo MD;  Location: [facility] OR;  Service: Gastroenterology;  Laterality: N/A;  normal colon   • ENDOSCOPY N/A 2020    Procedure: ESOPHAGOGASTRODUODENOSCOPY with biopsies;  Surgeon: Osmin Oviedo MD;  Location: [facility] OR;  Service: Gastroenterology;  Laterality: N/A;  erosive esophagitis  gastritis  duodenal biopsy  gastric biopsy  distal esophagus biopsy   • LEEP N/A 2018    Procedure: LOOP ELECTROCAUTERY EXCISION PROCEDURE BIOPSY OF CERVIX;  Surgeon: Sorin Bower MD;  Location: [facility] OR;  Service: Obstetrics/Gynecology   • TUBAL ABDOMINAL LIGATION     • TUBAL ABDOMINAL LIGATION           FAMILY HISTORY  Family History   Problem " "Relation Age of Onset   • Colon polyps Mother    • Colon cancer Paternal Aunt    • Colon polyps Paternal Aunt          SOCIAL HISTORY  Social History     Occupational History   • Not on file   Tobacco Use   • Smoking status: Never Smoker   • Smokeless tobacco: Never Used   Substance and Sexual Activity   • Alcohol use: No   • Drug use: Not Currently     Types: Marijuana, Methamphetamines     Comment: DRUG FREE SINCE 2009  SMOKED sUN   • Sexual activity: Yes     Birth control/protection: None     Comment: TUBAL         CURRENT MEDICATIONS    Current Outpatient Medications:   •  Multiple Vitamins-Minerals (ADULT GUMMY PO), Take 1 tablet by mouth Daily., Disp: , Rfl:   •  omeprazole (priLOSEC) 40 MG capsule, Take 1 capsule by mouth 2 (two) times a day., Disp: 180 capsule, Rfl: 3  •  SUMAtriptan (IMITREX) 50 MG tablet, TK 1 T PO AT ONSET OF HEADACHE AND MAY REPEAT IN 1 HOUR, Disp: , Rfl:   No current facility-administered medications for this visit.     Facility-Administered Medications Ordered in Other Visits:   •  lactated ringers infusion, 100 mL/hr, Intravenous, Continuous, Joanne Jefferson CRNA  •  ondansetron (ZOFRAN) injection 4 mg, 4 mg, Intravenous, Once PRN, Joanne Jefferson CRNA    ALLERGIES  Sulfamethoxazole-trimethoprim    VITALS  Vitals:    08/03/20 1419   Temp: 98.2 °F (36.8 °C)   TempSrc: Temporal   Weight: 57.9 kg (127 lb 9.6 oz)   Height: 162.6 cm (64.02\")       LAST RESULTS   Admission on 07/29/2020, Discharged on 07/29/2020   Component Date Value Ref Range Status   • Case Report 07/29/2020    Final                    Value:Surgical Pathology Report                         Case: [id number]                                  Authorizing Provider:  Osmin Oviedo        Collected:           07/29/2020 09:07 AM                                 MD Celso                                                                   Ordering Location:     [facility]   Received:            07/29/2020 " "10:58 AM                                 OR                                                                           Pathologist:           Adrián Fischer MD                                                         Specimens:   1) - Small Intestine, duodenal biopsy                                                               2) - Stomach, gastric biopsy                                                                        3) - Esophagus, Distal, distal esophagus biopsy                                           • Final Diagnosis 07/29/2020    Final                    Value:This result contains rich text formatting which cannot be displayed here.   • Gross Description 07/29/2020    Final                    Value:This result contains rich text formatting which cannot be displayed here.   • Microscopic Description 07/29/2020    Final                    Value:This result contains rich text formatting which cannot be displayed here.     No results found.    PHYSICAL EXAM  Debilities/Disabilities Identified: None  Emotional Behavior: Appropriate  Wt Readings from Last 3 Encounters:   08/03/20 57.9 kg (127 lb 9.6 oz)   07/29/20 56 kg (123 lb 6.4 oz)   07/01/20 57.1 kg (125 lb 12.8 oz)     Ht Readings from Last 1 Encounters:   08/03/20 162.6 cm (64.02\")     Body mass index is 21.89 kg/m².  Physical Exam   Constitutional: She is oriented to person, place, and time. She appears well-developed and well-nourished.   HENT:   Head: Normocephalic and atraumatic.   Eyes: Pupils are equal, round, and reactive to light. Conjunctivae are normal.   Neck: Normal range of motion. Neck supple.   Cardiovascular: Normal rate and regular rhythm.   Pulmonary/Chest: Effort normal and breath sounds normal.   Abdominal: Soft. Bowel sounds are normal. There is tenderness in the epigastric area.   Slight epigastric tenderness   Musculoskeletal: Normal range of motion.   Lymphadenopathy:     She has no cervical adenopathy.   Neurological: She " "is alert and oriented to person, place, and time.   Skin: Skin is warm and dry.   Psychiatric: She has a normal mood and affect. Her behavior is normal.   Nursing note and vitals reviewed.      CLINICAL DATA REVIEWED   reviewed previous lab results and integrated with today's visit, reviewed notes from other physicians and/or last GI encounter, reviewed previous endoscopy results and available photos    ASSESSMENT  Diagnoses and all orders for this visit:    GERD with esophagitis          PLAN  Return if symptoms worsen or fail to improve.     Her IBS is balanced at this point and not having diarrhea or constipation and improved in gastric \"burning\" since increasing PPI to BID.    She needs to stay on the omeprazole 40mg bid permanently as this is the dose that healed her.  Take daily probiotic and increase fiber in her diet to min of 25-30gm a day to keep IBS balanced. Verb understanding and will recall for colon in 2025.     I have discussed the above plan with the patient.  They verbalize understanding and are in agreement with the plan.  They have been advised to contact the office for any questions, concerns, or changes related to their health.                      "

## 2021-04-26 DIAGNOSIS — K21.9 GERD WITHOUT ESOPHAGITIS: ICD-10-CM

## 2021-04-26 RX ORDER — OMEPRAZOLE 40 MG/1
40 CAPSULE, DELAYED RELEASE ORAL 2 TIMES DAILY
Qty: 180 CAPSULE | Refills: 3 | Status: SHIPPED | OUTPATIENT
Start: 2021-04-26

## 2021-05-05 ENCOUNTER — TELEPHONE (OUTPATIENT)
Dept: GASTROENTEROLOGY | Facility: CLINIC | Age: 46
End: 2021-05-05

## 2023-07-26 ENCOUNTER — APPOINTMENT (OUTPATIENT)
Dept: CT IMAGING | Facility: HOSPITAL | Age: 48
End: 2023-07-26

## 2023-07-26 ENCOUNTER — HOSPITAL ENCOUNTER (EMERGENCY)
Facility: HOSPITAL | Age: 48
Discharge: LEFT BEFORE DISCHARGE | End: 2023-07-26
Attending: EMERGENCY MEDICINE | Admitting: EMERGENCY MEDICINE

## 2023-07-26 VITALS
DIASTOLIC BLOOD PRESSURE: 80 MMHG | SYSTOLIC BLOOD PRESSURE: 115 MMHG | HEART RATE: 76 BPM | RESPIRATION RATE: 16 BRPM | HEIGHT: 64 IN | OXYGEN SATURATION: 100 % | TEMPERATURE: 98.3 F | WEIGHT: 117 LBS | BODY MASS INDEX: 19.97 KG/M2

## 2023-07-26 DIAGNOSIS — R51.9 CHRONIC NONINTRACTABLE HEADACHE, UNSPECIFIED HEADACHE TYPE: Primary | ICD-10-CM

## 2023-07-26 DIAGNOSIS — R10.30 LOWER ABDOMINAL PAIN: ICD-10-CM

## 2023-07-26 DIAGNOSIS — G89.29 CHRONIC NONINTRACTABLE HEADACHE, UNSPECIFIED HEADACHE TYPE: Primary | ICD-10-CM

## 2023-07-26 LAB
ALBUMIN SERPL-MCNC: 4.9 G/DL (ref 3.5–5.2)
ALBUMIN/GLOB SERPL: 2 G/DL
ALP SERPL-CCNC: 77 U/L (ref 39–117)
ALT SERPL W P-5'-P-CCNC: 13 U/L (ref 1–33)
ANION GAP SERPL CALCULATED.3IONS-SCNC: 14.1 MMOL/L (ref 5–15)
AST SERPL-CCNC: 19 U/L (ref 1–32)
BACTERIA UR QL AUTO: ABNORMAL /HPF
BASOPHILS # BLD AUTO: 0.03 10*3/MM3 (ref 0–0.2)
BASOPHILS NFR BLD AUTO: 0.5 % (ref 0–1.5)
BILIRUB SERPL-MCNC: 0.3 MG/DL (ref 0–1.2)
BILIRUB UR QL STRIP: NEGATIVE
BUN SERPL-MCNC: 10 MG/DL (ref 6–20)
BUN/CREAT SERPL: 11.9 (ref 7–25)
CALCIUM SPEC-SCNC: 9.4 MG/DL (ref 8.6–10.5)
CHLORIDE SERPL-SCNC: 108 MMOL/L (ref 98–107)
CLARITY UR: CLEAR
CO2 SERPL-SCNC: 19.9 MMOL/L (ref 22–29)
COLOR UR: YELLOW
CREAT SERPL-MCNC: 0.84 MG/DL (ref 0.57–1)
DEPRECATED RDW RBC AUTO: 44.2 FL (ref 37–54)
EGFRCR SERPLBLD CKD-EPI 2021: 86.4 ML/MIN/1.73
EOSINOPHIL # BLD AUTO: 0.1 10*3/MM3 (ref 0–0.4)
EOSINOPHIL NFR BLD AUTO: 1.5 % (ref 0.3–6.2)
ERYTHROCYTE [DISTWIDTH] IN BLOOD BY AUTOMATED COUNT: 12.6 % (ref 12.3–15.4)
GLOBULIN UR ELPH-MCNC: 2.4 GM/DL
GLUCOSE SERPL-MCNC: 98 MG/DL (ref 65–99)
GLUCOSE UR STRIP-MCNC: NEGATIVE MG/DL
HCT VFR BLD AUTO: 43.3 % (ref 34–46.6)
HGB BLD-MCNC: 14.1 G/DL (ref 12–15.9)
HGB UR QL STRIP.AUTO: ABNORMAL
HYALINE CASTS UR QL AUTO: ABNORMAL /LPF
IMM GRANULOCYTES # BLD AUTO: 0.01 10*3/MM3 (ref 0–0.05)
IMM GRANULOCYTES NFR BLD AUTO: 0.2 % (ref 0–0.5)
KETONES UR QL STRIP: NEGATIVE
LEUKOCYTE ESTERASE UR QL STRIP.AUTO: NEGATIVE
LIPASE SERPL-CCNC: 21 U/L (ref 13–60)
LYMPHOCYTES # BLD AUTO: 2.1 10*3/MM3 (ref 0.7–3.1)
LYMPHOCYTES NFR BLD AUTO: 32.3 % (ref 19.6–45.3)
MCH RBC QN AUTO: 30.7 PG (ref 26.6–33)
MCHC RBC AUTO-ENTMCNC: 32.6 G/DL (ref 31.5–35.7)
MCV RBC AUTO: 94.1 FL (ref 79–97)
MONOCYTES # BLD AUTO: 0.56 10*3/MM3 (ref 0.1–0.9)
MONOCYTES NFR BLD AUTO: 8.6 % (ref 5–12)
NEUTROPHILS NFR BLD AUTO: 3.71 10*3/MM3 (ref 1.7–7)
NEUTROPHILS NFR BLD AUTO: 56.9 % (ref 42.7–76)
NITRITE UR QL STRIP: NEGATIVE
PH UR STRIP.AUTO: 6 [PH] (ref 4.5–8)
PLATELET # BLD AUTO: 474 10*3/MM3 (ref 140–450)
PMV BLD AUTO: 8.6 FL (ref 6–12)
POTASSIUM SERPL-SCNC: 3.6 MMOL/L (ref 3.5–5.2)
PROT SERPL-MCNC: 7.3 G/DL (ref 6–8.5)
PROT UR QL STRIP: NEGATIVE
RBC # BLD AUTO: 4.6 10*6/MM3 (ref 3.77–5.28)
RBC # UR STRIP: ABNORMAL /HPF
REF LAB TEST METHOD: ABNORMAL
SODIUM SERPL-SCNC: 142 MMOL/L (ref 136–145)
SP GR UR STRIP: 1.01 (ref 1–1.03)
SQUAMOUS #/AREA URNS HPF: ABNORMAL /HPF
UROBILINOGEN UR QL STRIP: ABNORMAL
WBC # UR STRIP: ABNORMAL /HPF
WBC NRBC COR # BLD: 6.51 10*3/MM3 (ref 3.4–10.8)

## 2023-07-26 PROCEDURE — 80053 COMPREHEN METABOLIC PANEL: CPT | Performed by: EMERGENCY MEDICINE

## 2023-07-26 PROCEDURE — 25010000002 PROCHLORPERAZINE 10 MG/2ML SOLUTION: Performed by: EMERGENCY MEDICINE

## 2023-07-26 PROCEDURE — 25010000002 DIPHENHYDRAMINE PER 50 MG: Performed by: EMERGENCY MEDICINE

## 2023-07-26 PROCEDURE — 99283 EMERGENCY DEPT VISIT LOW MDM: CPT

## 2023-07-26 PROCEDURE — 83690 ASSAY OF LIPASE: CPT | Performed by: EMERGENCY MEDICINE

## 2023-07-26 PROCEDURE — 85025 COMPLETE CBC W/AUTO DIFF WBC: CPT | Performed by: EMERGENCY MEDICINE

## 2023-07-26 PROCEDURE — 96361 HYDRATE IV INFUSION ADD-ON: CPT

## 2023-07-26 PROCEDURE — 96375 TX/PRO/DX INJ NEW DRUG ADDON: CPT

## 2023-07-26 PROCEDURE — 96374 THER/PROPH/DIAG INJ IV PUSH: CPT

## 2023-07-26 PROCEDURE — 81001 URINALYSIS AUTO W/SCOPE: CPT | Performed by: EMERGENCY MEDICINE

## 2023-07-26 PROCEDURE — 25010000002 KETOROLAC TROMETHAMINE PER 15 MG: Performed by: EMERGENCY MEDICINE

## 2023-07-26 RX ORDER — SODIUM CHLORIDE 9 MG/ML
250 INJECTION, SOLUTION INTRAVENOUS CONTINUOUS
Status: DISCONTINUED | OUTPATIENT
Start: 2023-07-26 | End: 2023-07-26 | Stop reason: HOSPADM

## 2023-07-26 RX ORDER — PROCHLORPERAZINE EDISYLATE 5 MG/ML
5 INJECTION INTRAMUSCULAR; INTRAVENOUS ONCE
Status: COMPLETED | OUTPATIENT
Start: 2023-07-26 | End: 2023-07-26

## 2023-07-26 RX ORDER — DIPHENHYDRAMINE HYDROCHLORIDE 50 MG/ML
25 INJECTION INTRAMUSCULAR; INTRAVENOUS ONCE
Status: COMPLETED | OUTPATIENT
Start: 2023-07-26 | End: 2023-07-26

## 2023-07-26 RX ORDER — KETOROLAC TROMETHAMINE 30 MG/ML
30 INJECTION, SOLUTION INTRAMUSCULAR; INTRAVENOUS ONCE
Status: COMPLETED | OUTPATIENT
Start: 2023-07-26 | End: 2023-07-26

## 2023-07-26 RX ORDER — SODIUM CHLORIDE 0.9 % (FLUSH) 0.9 %
10 SYRINGE (ML) INJECTION AS NEEDED
Status: DISCONTINUED | OUTPATIENT
Start: 2023-07-26 | End: 2023-07-26 | Stop reason: HOSPADM

## 2023-07-26 RX ADMIN — SODIUM CHLORIDE 250 ML/HR: 9 INJECTION, SOLUTION INTRAVENOUS at 09:12

## 2023-07-26 RX ADMIN — PROCHLORPERAZINE EDISYLATE 5 MG: 5 INJECTION, SOLUTION INTRAMUSCULAR; INTRAVENOUS at 09:11

## 2023-07-26 RX ADMIN — DIPHENHYDRAMINE HYDROCHLORIDE 25 MG: 50 INJECTION, SOLUTION INTRAMUSCULAR; INTRAVENOUS at 09:10

## 2023-07-26 RX ADMIN — KETOROLAC TROMETHAMINE 30 MG: 30 INJECTION, SOLUTION INTRAMUSCULAR; INTRAVENOUS at 09:09

## 2023-07-26 NOTE — ED PROVIDER NOTES
Subjective     History provided by:  Patient  History of Present Illness    Chief complaint: Headache and abdominal pain    Location: Headache on the right side of the head.  Abdominal pain in the lower abdomen worse on the right than the left.    Quality/Severity: Headache described as a severe squeezing pressure.  Abdominal pain described as sharp and stabbing.    Timing/Onset: She states she gets these headaches daily for a year.  This particular headache started yesterday morning.  The abdominal pain started yesterday morning as well.    Modifying Factors: The headache is not exacerbated by light.  Her Imitrex is no longer working for the last couple days.  Her abdomen is tender.    Associated symptoms: She states initially she had a droop on her right eye due to the headache which is normal for her headaches.  She states she has had watery diarrhea today and nausea.  No vomiting.  No fever.  She reports urinary urgency and urinary frequency with small volumes but denies dysuria.    Narrative: The patient is a 47-year-old white female who states that she has daily headaches for a year usually on the right side associated with a right eye droop.  She states she has been diagnosed with migraines and is prescribed Imitrex, but has not been working for the last several days.  She states that she also has lower abdominal pain that is sharp and stabbing and worse on the right.  She has had watery diarrhea x3 today.  She has had a little nausea without vomiting today.  She has urinary frequency and urgency.  She denies any neck pain or stiffness.  No photophobia.    Review of Systems    Past Medical History:   Diagnosis Date    Anxiety     GERD (gastroesophageal reflux disease)     Migraine        Allergies   Allergen Reactions    Topamax [Topiramate] GI Intolerance and Headache    Sulfamethoxazole-Trimethoprim Dizziness and Rash     dizziness       Past Surgical History:   Procedure Laterality Date    COLONOSCOPY       COLONOSCOPY N/A 7/29/2020    Procedure: COLONOSCOPY;  Surgeon: Osmin Oviedo MD;  Location: [facility] OR;  Service: Gastroenterology;  Laterality: N/A;  normal colon    ENDOSCOPY N/A 7/29/2020    Procedure: ESOPHAGOGASTRODUODENOSCOPY with biopsies;  Surgeon: Osmin Oviedo MD;  Location: [facility] OR;  Service: Gastroenterology;  Laterality: N/A;  erosive esophagitis  gastritis  duodenal biopsy  gastric biopsy  distal esophagus biopsy    LEEP N/A 9/24/2018    Procedure: LOOP ELECTROCAUTERY EXCISION PROCEDURE BIOPSY OF CERVIX;  Surgeon: Sorin Bower MD;  Location: [facility] OR;  Service: Obstetrics/Gynecology    TUBAL ABDOMINAL LIGATION      TUBAL ABDOMINAL LIGATION         Family History   Problem Relation Age of Onset    Colon polyps Mother     Colon cancer Paternal Aunt     Colon polyps Paternal Aunt        Social History     Socioeconomic History    Marital status: Single   Tobacco Use    Smoking status: Never    Smokeless tobacco: Never   Vaping Use    Vaping Use: Never used   Substance and Sexual Activity    Alcohol use: No    Drug use: Not Currently     Types: Marijuana, Methamphetamines     Comment: DRUG FREE SINCE 2009  SMOKED sUN    Sexual activity: Yes     Birth control/protection: None     Comment: TUBAL           Objective   Physical Exam  Vitals and nursing note reviewed.   Constitutional:       General: She is not in acute distress.     Appearance: Normal appearance. She is well-developed and normal weight. She is not ill-appearing, toxic-appearing or diaphoretic.      Comments: The patient appears healthy in no acute distress.  Review of her vital signs: She is afebrile and all her vital signs within limits.   HENT:      Head: Normocephalic and atraumatic.      Nose: Nose normal.      Mouth/Throat:      Pharynx: No oropharyngeal exudate.   Eyes:      General: No scleral icterus.        Right eye: No discharge.         Left eye: No discharge.      Pupils: Pupils are  equal, round, and reactive to light.   Neck:      Thyroid: No thyromegaly.      Vascular: No JVD.   Cardiovascular:      Rate and Rhythm: Normal rate and regular rhythm.      Heart sounds: Normal heart sounds. No murmur heard.  Pulmonary:      Effort: Pulmonary effort is normal.      Breath sounds: Normal breath sounds. No wheezing, rhonchi or rales.   Chest:      Chest wall: No tenderness.   Abdominal:      General: Bowel sounds are normal. There is no distension.      Palpations: Abdomen is soft.      Tenderness: There is abdominal tenderness (Subjective right lower quadrant.). There is no right CVA tenderness, left CVA tenderness, guarding or rebound.   Musculoskeletal:         General: No tenderness or deformity. Normal range of motion.      Cervical back: Normal range of motion and neck supple. No rigidity or tenderness.   Lymphadenopathy:      Cervical: No cervical adenopathy.   Skin:     General: Skin is warm and dry.      Capillary Refill: Capillary refill takes less than 2 seconds.      Coloration: Skin is not pale.      Findings: No erythema or rash.   Neurological:      General: No focal deficit present.      Mental Status: She is alert and oriented to person, place, and time.      Cranial Nerves: No cranial nerve deficit.      Coordination: Coordination normal.      Comments: No focal motor sensory deficit   Psychiatric:         Mood and Affect: Mood normal.         Behavior: Behavior normal.         Thought Content: Thought content normal.         Judgment: Judgment normal.       Procedures           ED Course  ED Course as of 07/26/23 0941   Wed Jul 26, 2023   0936 Review of the patient's lower laboratory studies: The patient's CMP is essentially normal.  CBC normal.  Lipase normal.  Urinalysis negative for infection. [TP]   0936 The patient was administered Compazine 5 mg and Benadryl 25 mg and Toradol 30 mg IV for her migraine headache.  She was administered IV normal saline at 250 cc/h. [TP]   0937  09: 35 the patient states she no longer wishes to wait for her scans and wants to leave.  I informed her that I cannot rule out more serious pathology without the scans.  She will be discharged AGAINST MEDICAL ADVICE. [TP]      ED Course User Index  [TP] Christiano Wagner MD                                           Medical Decision Making  My differential diagnosis for abdominal pain includes but is not limited to:  Gastritis, gastroenteritis, peptic ulcer disease, GERD, esophageal perforation, acute appendicitis, mesenteric adenitis, Meckel's diverticulum, epiploic appendagitis, diverticulitis, colon cancer, ulcerative colitis, Crohn's disease, intussusception, small bowel obstruction, adhesions, ischemic bowel, perforated viscus, ileus, obstipation, biliary colic, cholecystitis, cholelithiasis, Art-Brendan Joe, hepatitis, pancreatitis, common bile duct obstruction, cholangitis, bile leak, splenic trauma, splenic rupture, splenic infarction, splenic abscess, abdominal abscess, ascites, spontaneous bacterial peritonitis, hernia, UTI, cystitis, prostatitis, ureterolithiasis, urinary obstruction, AAA, myocardial infarction, pneumonia, cancer, porphyria, DKA, medications, sickle cell, viral syndrome, zoster   My differential diagnosis for headache includes but is not limited to:  Migraine, cluster, ischemic stroke, subarachnoid hemorrhage, intracranial hemorrhage, vascular malformation, cerebral aneurysm, vascular dissection, vasculitis, temporal arteritis, malignant hypertension, pheochromocytoma, cerebral venous thrombosis, preeclampsia; bacterial meningitis, viral meningitis, fungal meningitis, encephalitis, brain abscess, pleural empyema, sinusitis, dental infection, influenza, viral syndrome; carbon monoxide exposure, analgesic abuse, hypoglycemia; trigeminal neuralgia, postherpetic neuralgia, occipital neuralgia; subdural hematoma, concussion, musculoskeletal tension, cervical osteoarthritis; glaucoma, TMJ  disease, pseudotumor cerebri, post LP headache, intracranial neoplasm, sleep apnea     Problems Addressed:  Chronic nonintractable headache, unspecified headache type: complicated acute illness or injury  Lower abdominal pain: complicated acute illness or injury    Amount and/or Complexity of Data Reviewed  Labs: ordered. Decision-making details documented in ED Course.  Radiology: ordered.    Risk  Prescription drug management.          Labs Reviewed   COMPREHENSIVE METABOLIC PANEL - Abnormal; Notable for the following components:       Result Value    Chloride 108 (*)     CO2 19.9 (*)     All other components within normal limits    Narrative:     GFR Normal >60  Chronic Kidney Disease <60  Kidney Failure <15     URINALYSIS W/ MICROSCOPIC IF INDICATED (NO CULTURE) - Abnormal; Notable for the following components:    Blood, UA Trace (*)     All other components within normal limits   CBC WITH AUTO DIFFERENTIAL - Abnormal; Notable for the following components:    Platelets 474 (*)     All other components within normal limits   URINALYSIS, MICROSCOPIC ONLY - Abnormal; Notable for the following components:    RBC, UA 3-5 (*)     All other components within normal limits   LIPASE - Normal   CBC AND DIFFERENTIAL    Narrative:     The following orders were created for panel order CBC & Differential.  Procedure                               Abnormality         Status                     ---------                               -----------         ------                     CBC Auto Differential[[id number]]        Abnormal            Final result                 Please view results for these tests on the individual orders.   Renard prasad       Medication List      No changes were made to your prescriptions during this visit.           Final diagnoses:   Chronic nonintractable headache, unspecified headache type   Lower abdominal pain       ED Disposition  ED Disposition       ED Disposition   AMA    Condition   --    Comment    --               Warren Hassan MD  [address]  [address]  [place] [address]  [phone]    Schedule an appointment as soon as possible for a visit   next available         Medication List      No changes were made to your prescriptions during this visit.            Christiano Wagner MD  07/26/23 1274

## 2024-03-14 ENCOUNTER — APPOINTMENT (OUTPATIENT)
Dept: GENERAL RADIOLOGY | Facility: HOSPITAL | Age: 49
End: 2024-03-14

## 2024-03-14 ENCOUNTER — HOSPITAL ENCOUNTER (EMERGENCY)
Facility: HOSPITAL | Age: 49
Discharge: HOME | End: 2024-03-14
Attending: EMERGENCY MEDICINE

## 2024-03-14 VITALS
TEMPERATURE: 97.8 F | RESPIRATION RATE: 20 BRPM | WEIGHT: 120 LBS | BODY MASS INDEX: 20.49 KG/M2 | DIASTOLIC BLOOD PRESSURE: 85 MMHG | SYSTOLIC BLOOD PRESSURE: 121 MMHG | OXYGEN SATURATION: 100 % | HEART RATE: 79 BPM | HEIGHT: 64 IN

## 2024-03-14 DIAGNOSIS — M77.9 TENDONITIS: ICD-10-CM

## 2024-03-14 DIAGNOSIS — S76.211A STRAIN OF GROIN, RIGHT, INITIAL ENCOUNTER: Primary | ICD-10-CM

## 2024-03-14 PROCEDURE — 63710000001 ONDANSETRON ODT 4 MG TABLET DISPERSIBLE: Performed by: EMERGENCY MEDICINE

## 2024-03-14 PROCEDURE — 73502 X-RAY EXAM HIP UNI 2-3 VIEWS: CPT

## 2024-03-14 PROCEDURE — 63710000001 PREDNISONE PER 1 MG: Performed by: EMERGENCY MEDICINE

## 2024-03-14 PROCEDURE — 99283 EMERGENCY DEPT VISIT LOW MDM: CPT

## 2024-03-14 RX ORDER — ONDANSETRON 4 MG/1
4 TABLET, ORALLY DISINTEGRATING ORAL ONCE
Status: COMPLETED | OUTPATIENT
Start: 2024-03-14 | End: 2024-03-14

## 2024-03-14 RX ORDER — HYDROCODONE BITARTRATE AND ACETAMINOPHEN 5; 325 MG/1; MG/1
2 TABLET ORAL ONCE
Status: COMPLETED | OUTPATIENT
Start: 2024-03-14 | End: 2024-03-14

## 2024-03-14 RX ORDER — NAPROXEN 500 MG/1
500 TABLET ORAL 2 TIMES DAILY PRN
Qty: 20 TABLET | Refills: 0 | Status: SHIPPED | OUTPATIENT
Start: 2024-03-14

## 2024-03-14 RX ORDER — PREDNISONE 20 MG/1
60 TABLET ORAL ONCE
Status: COMPLETED | OUTPATIENT
Start: 2024-03-14 | End: 2024-03-14

## 2024-03-14 RX ADMIN — HYDROCODONE BITARTRATE AND ACETAMINOPHEN 2 TABLET: 5; 325 TABLET ORAL at 12:52

## 2024-03-14 RX ADMIN — PREDNISONE 60 MG: 20 TABLET ORAL at 12:52

## 2024-03-14 RX ADMIN — ONDANSETRON 4 MG: 4 TABLET, ORALLY DISINTEGRATING ORAL at 12:52

## 2024-03-14 NOTE — Clinical Note
[place]  [facility] EMERGENCY DEPARTMENT  [address] [place] [address]  Phone: [phone]    Luanne Sun was seen and treated in our emergency department on 3/14/2024.  She may return to work on 03/18/2024.         Thank you for choosing [facility].    Itz Watt MD

## 2024-03-14 NOTE — Clinical Note
[place]  [facility] EMERGENCY DEPARTMENT  [address] [place] [address]  Phone: [phone]    Luanne Sun was seen and treated in our emergency department on 3/14/2024.  She may return to work on 03/18/2024.         Thank you for choosing [facility].    Itz Watt MD

## 2024-03-14 NOTE — Clinical Note
[place]  [facility] EMERGENCY DEPARTMENT  [address] [place] [address]  Phone: [phone]    Luanne Sun was seen and treated in our emergency department on 3/14/2024.  She may return to work on 03/18/2024.         Thank you for choosing [facility].    Itz Watt MD

## 2024-03-14 NOTE — ED PROVIDER NOTES
Subjective   History of Present Illness  Patient reports twisting yesterday and felt a sharp pop in her anterior groin and anterior right hip area.  Patient says it was immediately painful and she felt that shoot down her inner thigh.  Patient says after about 20 to 30 minutes the pain improved and she was able to ambulate.  Patient said the pain pretty much got better throughout the rest the day but was still sore.  Patient would to work today and said she twisted again and felt a sharp pain again to the point where she now does not want to bear weight because of the pain.  No therapy taken prior to arrival.  Patient Nuys any sensory loss or weakness to her right lower extremity.  No previous episodes.      Review of Systems   All other systems reviewed and are negative.      Past Medical History:   Diagnosis Date    Anxiety     GERD (gastroesophageal reflux disease)     Migraine        Allergies   Allergen Reactions    Topamax [Topiramate] GI Intolerance and Headache    Sulfamethoxazole-Trimethoprim Dizziness and Rash     dizziness       Past Surgical History:   Procedure Laterality Date    COLONOSCOPY      COLONOSCOPY N/A 7/29/2020    Procedure: COLONOSCOPY;  Surgeon: Osmin Oviedo MD;  Location: [facility];  Service: Gastroenterology;  Laterality: N/A;  normal colon    ENDOSCOPY N/A 7/29/2020    Procedure: ESOPHAGOGASTRODUODENOSCOPY with biopsies;  Surgeon: Osmin Oviedo MD;  Location: [facility] OR;  Service: Gastroenterology;  Laterality: N/A;  erosive esophagitis  gastritis  duodenal biopsy  gastric biopsy  distal esophagus biopsy    LEEP N/A 9/24/2018    Procedure: LOOP ELECTROCAUTERY EXCISION PROCEDURE BIOPSY OF CERVIX;  Surgeon: Sorin Bower MD;  Location: [facility];  Service: Obstetrics/Gynecology    TUBAL ABDOMINAL LIGATION      TUBAL ABDOMINAL LIGATION         Family History   Problem Relation Age of Onset    Colon polyps Mother     Colon cancer Paternal Aunt      Colon polyps Paternal Aunt        Social History     Socioeconomic History    Marital status: Single   Tobacco Use    Smoking status: Never    Smokeless tobacco: Never   Vaping Use    Vaping status: Never Used   Substance and Sexual Activity    Alcohol use: No    Drug use: Not Currently     Types: Marijuana, Methamphetamines     Comment: DRUG FREE SINCE 2009  SMOKED sUN    Sexual activity: Yes     Birth control/protection: None     Comment: TUBAL           Objective   Physical Exam  Vitals and nursing note reviewed.   HENT:      Head: Normocephalic.      Mouth/Throat:      Mouth: Mucous membranes are moist.      Pharynx: Oropharynx is clear.   Eyes:      Conjunctiva/sclera: Conjunctivae normal.   Pulmonary:      Effort: Pulmonary effort is normal.   Musculoskeletal:         General: No swelling or deformity.      Cervical back: Neck supple.        Legs:       Comments: No external signs of ecchymosis, swelling, or erythema.  Circled area is where patient says the pain is.  Mildly tender to palpation.  Full range of motion at left hip and knee without difficulty.  No tenderness to hip palpation.  No crepitus felt.   Skin:     General: Skin is warm and dry.      Capillary Refill: Capillary refill takes 2 to 3 seconds.   Neurological:      Mental Status: She is alert and oriented to person, place, and time.   Psychiatric:         Mood and Affect: Mood normal.         Behavior: Behavior normal.         Procedures           ED Course                                             Medical Decision Making  Ddx tendinitis, tendon inflammation from movement after being caught on a bony outcropping, leg strap tear, strap muscle strain, hip fracture, hip dislocation.    XR Hip With or Without Pelvis 2 - 3 View Right    Result Date: 3/14/2024  Impression: Normal pelvis and right hip Electronically Signed: Percy Degroot MD  3/14/2024 12:58 PM EDT  Workstation ID: [id number]    1300 Pt seen again prior to d/c.  Imaging reviewed and  are unremarkable.  Symptoms improved and pt feels better, vitals stable and pt. in NAD. Non-toxic. Comfortable. Ambulating without difficulty.  Tolerating po.  Relaxed breathing.  Advised the patient to take it easy over the next few days and avoid any type of squatting, lifting, pushing or pulling that could strain her groin.  Advised to apply cool compresses 2-3 times a day for the next few days.  Also going to prescribe an NSAID for pain and inflammatory relief. All questions personally answered at the bedside and all d/c instructions personally reviewed with pt.  Discussed the importance of close outpt. f/u and pt. understands this and agrees to do so.  Pt agrees to return to ED immediately for any new, persistent, or worsening symptoms.    EMR Dragon/Transcription disclaimer:  Much of this encounter note is an electronic transcription/translation of spoken language to printed text, aka voice recognition.  The electronic translation of spoken language may permit erroneous or at times nonsensical words or phrases to be inadvertently transcribed; although I have reviewed the note for such errors, some may still exist so please interpret based on surrounding text content.      Amount and/or Complexity of Data Reviewed  Radiology: ordered.    Risk  Prescription drug management.        Final diagnoses:   Strain of groin, right, initial encounter   Tendonitis       ED Disposition  ED Disposition       ED Disposition   Discharge    Condition   Stable    Comment   --               PCP    In 2 days      Terri Dale, APRN  [address]  [facility] [address]  [place] [address] [phone]    In 2 days           Medication List        New Prescriptions      naproxen 500 MG tablet  Commonly known as: NAPROSYN  Take 1 tablet by mouth 2 (Two) Times a Day As Needed for Moderate Pain.               Where to Get Your Medications        These medications were sent to [id number] - [place], [place] - [address] [address] [address] - [phone]  - [phone] [address]  [address], [place] [address]      Phone: [phone]   naproxen 500 MG tablet            Itz Watt MD  03/14/24 0092

## 2024-09-02 ENCOUNTER — HOSPITAL ENCOUNTER (EMERGENCY)
Facility: HOSPITAL | Age: 49
Discharge: HOME | End: 2024-09-02
Attending: STUDENT IN AN ORGANIZED HEALTH CARE EDUCATION/TRAINING PROGRAM

## 2024-09-02 ENCOUNTER — APPOINTMENT (OUTPATIENT)
Dept: CT IMAGING | Facility: HOSPITAL | Age: 49
End: 2024-09-02

## 2024-09-02 VITALS
WEIGHT: 122 LBS | TEMPERATURE: 98.4 F | RESPIRATION RATE: 18 BRPM | HEIGHT: 67 IN | BODY MASS INDEX: 19.15 KG/M2 | HEART RATE: 81 BPM | OXYGEN SATURATION: 100 % | SYSTOLIC BLOOD PRESSURE: 119 MMHG | DIASTOLIC BLOOD PRESSURE: 83 MMHG

## 2024-09-02 DIAGNOSIS — R42 VERTIGO: Primary | ICD-10-CM

## 2024-09-02 LAB
ALBUMIN SERPL-MCNC: 4.6 G/DL (ref 3.5–5.2)
ALBUMIN/GLOB SERPL: 1.9 G/DL
ALP SERPL-CCNC: 76 U/L (ref 39–117)
ALT SERPL W P-5'-P-CCNC: 8 U/L (ref 1–33)
ANION GAP SERPL CALCULATED.3IONS-SCNC: 11.8 MMOL/L (ref 5–15)
AST SERPL-CCNC: 22 U/L (ref 1–32)
BACTERIA UR QL AUTO: ABNORMAL /HPF
BASOPHILS # BLD AUTO: 0.04 10*3/MM3 (ref 0–0.2)
BASOPHILS NFR BLD AUTO: 0.5 % (ref 0–1.5)
BILIRUB SERPL-MCNC: 0.4 MG/DL (ref 0–1.2)
BILIRUB UR QL STRIP: NEGATIVE
BUN SERPL-MCNC: 10 MG/DL (ref 6–20)
BUN/CREAT SERPL: 14.5 (ref 7–25)
CALCIUM SPEC-SCNC: 9.4 MG/DL (ref 8.6–10.5)
CHLORIDE SERPL-SCNC: 109 MMOL/L (ref 98–107)
CLARITY UR: CLEAR
CO2 SERPL-SCNC: 20.2 MMOL/L (ref 22–29)
COLOR UR: YELLOW
CREAT SERPL-MCNC: 0.69 MG/DL (ref 0.57–1)
DEPRECATED RDW RBC AUTO: 43.6 FL (ref 37–54)
EGFRCR SERPLBLD CKD-EPI 2021: 107.2 ML/MIN/1.73
EOSINOPHIL # BLD AUTO: 0.08 10*3/MM3 (ref 0–0.4)
EOSINOPHIL NFR BLD AUTO: 1 % (ref 0.3–6.2)
ERYTHROCYTE [DISTWIDTH] IN BLOOD BY AUTOMATED COUNT: 12.3 % (ref 12.3–15.4)
FLUAV RNA RESP QL NAA+PROBE: NOT DETECTED
FLUBV RNA RESP QL NAA+PROBE: NOT DETECTED
GLOBULIN UR ELPH-MCNC: 2.4 GM/DL
GLUCOSE SERPL-MCNC: 112 MG/DL (ref 65–99)
GLUCOSE UR STRIP-MCNC: NEGATIVE MG/DL
HCT VFR BLD AUTO: 42.8 % (ref 34–46.6)
HGB BLD-MCNC: 13.9 G/DL (ref 12–15.9)
HGB UR QL STRIP.AUTO: ABNORMAL
HYALINE CASTS UR QL AUTO: ABNORMAL /LPF
IMM GRANULOCYTES # BLD AUTO: 0.01 10*3/MM3 (ref 0–0.05)
IMM GRANULOCYTES NFR BLD AUTO: 0.1 % (ref 0–0.5)
KETONES UR QL STRIP: NEGATIVE
LEUKOCYTE ESTERASE UR QL STRIP.AUTO: NEGATIVE
LYMPHOCYTES # BLD AUTO: 2.12 10*3/MM3 (ref 0.7–3.1)
LYMPHOCYTES NFR BLD AUTO: 25.9 % (ref 19.6–45.3)
MCH RBC QN AUTO: 31 PG (ref 26.6–33)
MCHC RBC AUTO-ENTMCNC: 32.5 G/DL (ref 31.5–35.7)
MCV RBC AUTO: 95.5 FL (ref 79–97)
MONOCYTES # BLD AUTO: 0.52 10*3/MM3 (ref 0.1–0.9)
MONOCYTES NFR BLD AUTO: 6.4 % (ref 5–12)
NEUTROPHILS NFR BLD AUTO: 5.41 10*3/MM3 (ref 1.7–7)
NEUTROPHILS NFR BLD AUTO: 66.1 % (ref 42.7–76)
NITRITE UR QL STRIP: NEGATIVE
PH UR STRIP.AUTO: 7 [PH] (ref 4.5–8)
PLATELET # BLD AUTO: 451 10*3/MM3 (ref 140–450)
PMV BLD AUTO: 8.7 FL (ref 6–12)
POTASSIUM SERPL-SCNC: 3.6 MMOL/L (ref 3.5–5.2)
PROT SERPL-MCNC: 7 G/DL (ref 6–8.5)
PROT UR QL STRIP: NEGATIVE
QT INTERVAL: 410 MS
QTC INTERVAL: 406 MS
RBC # BLD AUTO: 4.48 10*6/MM3 (ref 3.77–5.28)
RBC # UR STRIP: ABNORMAL /HPF
REF LAB TEST METHOD: ABNORMAL
SARS-COV-2 RNA RESP QL NAA+PROBE: NOT DETECTED
SODIUM SERPL-SCNC: 141 MMOL/L (ref 136–145)
SP GR UR STRIP: 1.01 (ref 1–1.03)
SQUAMOUS #/AREA URNS HPF: ABNORMAL /HPF
UROBILINOGEN UR QL STRIP: ABNORMAL
WBC # UR STRIP: ABNORMAL /HPF
WBC NRBC COR # BLD AUTO: 8.18 10*3/MM3 (ref 3.4–10.8)

## 2024-09-02 PROCEDURE — 93010 ELECTROCARDIOGRAM REPORT: CPT | Performed by: INTERNAL MEDICINE

## 2024-09-02 PROCEDURE — 99284 EMERGENCY DEPT VISIT MOD MDM: CPT | Performed by: STUDENT IN AN ORGANIZED HEALTH CARE EDUCATION/TRAINING PROGRAM

## 2024-09-02 PROCEDURE — 70450 CT HEAD/BRAIN W/O DYE: CPT

## 2024-09-02 PROCEDURE — 80053 COMPREHEN METABOLIC PANEL: CPT | Performed by: STUDENT IN AN ORGANIZED HEALTH CARE EDUCATION/TRAINING PROGRAM

## 2024-09-02 PROCEDURE — 96360 HYDRATION IV INFUSION INIT: CPT

## 2024-09-02 PROCEDURE — 81001 URINALYSIS AUTO W/SCOPE: CPT | Performed by: STUDENT IN AN ORGANIZED HEALTH CARE EDUCATION/TRAINING PROGRAM

## 2024-09-02 PROCEDURE — 93005 ELECTROCARDIOGRAM TRACING: CPT | Performed by: STUDENT IN AN ORGANIZED HEALTH CARE EDUCATION/TRAINING PROGRAM

## 2024-09-02 PROCEDURE — 85025 COMPLETE CBC W/AUTO DIFF WBC: CPT | Performed by: STUDENT IN AN ORGANIZED HEALTH CARE EDUCATION/TRAINING PROGRAM

## 2024-09-02 PROCEDURE — 25810000003 SODIUM CHLORIDE 0.9 % SOLUTION: Performed by: STUDENT IN AN ORGANIZED HEALTH CARE EDUCATION/TRAINING PROGRAM

## 2024-09-02 PROCEDURE — 87636 SARSCOV2 & INF A&B AMP PRB: CPT | Performed by: STUDENT IN AN ORGANIZED HEALTH CARE EDUCATION/TRAINING PROGRAM

## 2024-09-02 RX ORDER — MECLIZINE HYDROCHLORIDE 25 MG/1
50 TABLET ORAL ONCE
Status: DISCONTINUED | OUTPATIENT
Start: 2024-09-02 | End: 2024-09-02

## 2024-09-02 RX ORDER — MECLIZINE HYDROCHLORIDE 25 MG/1
TABLET ORAL
Status: COMPLETED
Start: 2024-09-02 | End: 2024-09-02

## 2024-09-02 RX ORDER — MECLIZINE HYDROCHLORIDE 25 MG/1
50 TABLET ORAL ONCE
Status: COMPLETED | OUTPATIENT
Start: 2024-09-02 | End: 2024-09-02

## 2024-09-02 RX ORDER — MECLIZINE HYDROCHLORIDE 25 MG/1
25 TABLET ORAL 4 TIMES DAILY PRN
Qty: 28 TABLET | Refills: 0 | Status: SHIPPED | OUTPATIENT
Start: 2024-09-02 | End: 2024-09-09

## 2024-09-02 RX ADMIN — MECLIZINE HYDROCHLORIDE 50 MG: 25 TABLET ORAL at 11:19

## 2024-09-02 RX ADMIN — SODIUM CHLORIDE 1000 ML: 9 INJECTION, SOLUTION INTRAVENOUS at 11:15

## 2024-09-02 NOTE — ED PROVIDER NOTES
Subjective   History of Present Illness  Pt is a 48 y.o. female with PMH as listed who presents for   Chief Complaint   Patient presents with    Dizziness       Patient is a 48-year-old female who presents for dizziness/lightheadedness for the past day.  States that she has a history of migraines and has had a migraine that was typical of her prior migraines for the past week, has also had increased rhinorrhea congestion and some slight right ear pain for the past week.  She was hoping to get better but since yesterday morning she has been significantly dizzy, the dizziness is to the point where she has difficulty ambulating she states.  No chest pain shortness of breath nausea vomiting no weakness numbness or tingling otherwise on my evaluation.  No other new complaints currently.      Review of Systems    Past Medical History:   Diagnosis Date    Anxiety     GERD (gastroesophageal reflux disease)     Migraine        Allergies   Allergen Reactions    Topamax [Topiramate] GI Intolerance and Headache    Sulfamethoxazole-Trimethoprim Dizziness and Rash     dizziness       Past Surgical History:   Procedure Laterality Date    COLONOSCOPY      COLONOSCOPY N/A 7/29/2020    Procedure: COLONOSCOPY;  Surgeon: Osmin Oviedo MD;  Location: [facility] OR;  Service: Gastroenterology;  Laterality: N/A;  normal colon    ENDOSCOPY N/A 7/29/2020    Procedure: ESOPHAGOGASTRODUODENOSCOPY with biopsies;  Surgeon: Osmin Oviedo MD;  Location: [facility] OR;  Service: Gastroenterology;  Laterality: N/A;  erosive esophagitis  gastritis  duodenal biopsy  gastric biopsy  distal esophagus biopsy    LEEP N/A 9/24/2018    Procedure: LOOP ELECTROCAUTERY EXCISION PROCEDURE BIOPSY OF CERVIX;  Surgeon: Sorin Bower MD;  Location: [facility] OR;  Service: Obstetrics/Gynecology    TUBAL ABDOMINAL LIGATION      TUBAL ABDOMINAL LIGATION         Family History   Problem Relation Age of Onset    Colon polyps Mother      Colon cancer Paternal Aunt     Colon polyps Paternal Aunt        Social History     Socioeconomic History    Marital status: Single   Tobacco Use    Smoking status: Never    Smokeless tobacco: Never   Vaping Use    Vaping status: Never Used   Substance and Sexual Activity    Alcohol use: No    Drug use: Not Currently     Types: Marijuana, Methamphetamines     Comment: DRUG FREE SINCE 2009  SMOKED sUN    Sexual activity: Yes     Birth control/protection: None     Comment: TUBAL           Objective   Physical Exam  Constitutional:       Appearance: Normal appearance.   HENT:      Head: Normocephalic and atraumatic.      Right Ear: Tympanic membrane and external ear normal.      Left Ear: Tympanic membrane and external ear normal.      Mouth/Throat:      Mouth: Mucous membranes are moist.      Pharynx: Oropharynx is clear.   Eyes:      Conjunctiva/sclera: Conjunctivae normal.   Cardiovascular:      Rate and Rhythm: Normal rate and regular rhythm.   Pulmonary:      Effort: Pulmonary effort is normal.      Breath sounds: Normal breath sounds.   Abdominal:      General: Abdomen is flat.   Musculoskeletal:      Cervical back: Neck supple.   Skin:     General: Skin is warm and dry.   Neurological:      General: No focal deficit present.      Mental Status: She is alert and oriented to person, place, and time.      Cranial Nerves: No cranial nerve deficit.      Sensory: No sensory deficit.      Motor: No weakness.      Coordination: Coordination normal.   Psychiatric:         Mood and Affect: Mood normal.         Procedures           ED Course  ED Course as of 09/02/24 1303   Mon Sep 02, 2024   1103 Patient is a 48-year-old female who presents for dizziness/lightheadedness for the past day.  States that she has a history of migraines and has had a migraine that was typical of her prior migraines for the past week, has also had increased rhinorrhea congestion and some slight right ear pain for the past week.  She was hoping  to get better but since yesterday morning she has been significantly dizzy, the dizziness is to the point where she has difficulty ambulating she states.  No chest pain shortness of breath nausea vomiting no weakness numbness or tingling otherwise on my evaluation.  No other new complaints currently.  Will obtain CBC CMP UA EKG CT head and treat with fluid bolus and meclizine.  Patient's neuroexam was complete unremarkable, normal coordination and no pronator drift, low suspicion for central process given ear pain and URI symptoms.  Suspect peripheral vertigo. [JF]   1302 All lab workup unremarkable for any acute findings.  CT head shows no acute intracranial hemorrhage based on interpretation.  Patient states that her symptoms are improved with meclizine and fluid bolus here.  Discussed with patient plan for discharge PCP and ENT follow-up and prescription for meclizine provided.  Patient understands and agrees.  All questions answered. [JF]      ED Course User Index  [JF] Pb Mtz MD                          Total (NIH Stroke Scale): 0                  Medical Decision Making  My differential diagnosis for dizziness included but is not limited to:  Labyrinthitis, vertigo, concussion, intracranial hemorrhage, stroke, migraine headache, cardiac arrhythmias, acute MI, hypotension, hypertension, hypoxia, inner ear and middle ear infections, anemia, electrolyte abnormalities, dehydration, hyperventilation and anxiety attacks..        Problems Addressed:  Vertigo: complicated acute illness or injury    Amount and/or Complexity of Data Reviewed  Labs: ordered.  Radiology: ordered.  ECG/medicine tests: ordered.     Details: EKG  9/2/2024 at 1110  Rhythm sinus, rate 59  Normal axis, normal QT interval, no ST changes  EKG interpreted by me contemporaneously by me with care        Final diagnoses:   Vertigo       ED Disposition  ED Disposition       ED Disposition   Discharge    Condition   Stable    Comment   --                [facility] - [place] [address] [phone]  Schedule an appointment as soon as possible for a visit in 2 days  Follow-up with your PCP or call to schedule appointment to establish care., For re-evaluation    [facility]  [address]  [facility] [address] [phone]  Call in 1 day  For re-evaluation, to establish care         Medication List        New Prescriptions      meclizine 25 MG tablet  Commonly known as: ANTIVERT  Take 1 tablet by mouth 4 (Four) Times a Day As Needed for Dizziness for up to 7 days.               Where to Get Your Medications        These medications were sent to [id number] - [place], [place] - [address] [facility] [address] - [phone]  - [phone] [facility], [place] [address]      Phone: [phone]   meclizine 25 MG tablet            Pb Mtz MD  09/02/24 1640

## 2024-09-02 NOTE — Clinical Note
[place]  [facility] EMERGENCY DEPARTMENT  [address] [place] [address]  Phone: [phone]    Estelle Sun accompanied Luanne Sun to the emergency department on 9/2/2024. They may return to work on 09/03/2024.        Thank you for choosing [facility].    Pb Mtz MD

## 2024-09-02 NOTE — Clinical Note
[place]  [facility] EMERGENCY DEPARTMENT  [address] [place] [address]  Phone: [phone]    Luanne Sun was seen and treated in our emergency department on 9/2/2024.  She may return to work on 09/05/2024.  Not to use headset upon return to work until clear give by ENT       Thank you for choosing [facility].    Pb Mtz MD

## 2024-09-02 NOTE — Clinical Note
[place]  [facility] EMERGENCY DEPARTMENT  [address] [place] [address]  Phone: [phone]    Luanne Sun was seen and treated in our emergency department on 9/2/2024.  She may return to work on 09/05/2024.  Not to use headset upon return to work until clear give by ENT       Thank you for choosing [facility].    Pb Mtz MD

## 2024-09-02 NOTE — Clinical Note
[place]  [facility] EMERGENCY DEPARTMENT  [address] [place] [address]  Phone: [phone]    Luanne Sun was seen and treated in our emergency department on 9/2/2024.  She may return to work on 09/04/2024.         Thank you for choosing [facility].    Pb Mtz MD

## 2024-09-19 ENCOUNTER — HOSPITAL ENCOUNTER (OUTPATIENT)
Dept: GENERAL RADIOLOGY | Facility: HOSPITAL | Age: 49
Discharge: HOME | End: 2024-09-19
Admitting: NURSE PRACTITIONER

## 2024-09-19 DIAGNOSIS — M25.562 ACUTE PAIN OF LEFT KNEE: ICD-10-CM

## 2024-09-19 PROCEDURE — 73560 X-RAY EXAM OF KNEE 1 OR 2: CPT

## (undated) DEVICE — MEDI-VAC YANK SUCT HNDL W/TPRD BULBOUS TIP: Brand: CARDINAL HEALTH

## (undated) DEVICE — VIAL FORMALIN CAP 10P 40ML

## (undated) DEVICE — SYR LL 3CC

## (undated) DEVICE — PAD SANI MAXI W/ADHS SNG WRP 11IN

## (undated) DEVICE — THE BITE BLOCK MAXI, LATEX FREE STRAP IS USED TO PROTECT THE ENDOSCOPE INSERTION TUBE FROM BEING BITTEN BY THE PATIENT.

## (undated) DEVICE — GOWN,PREVENTION PLUS,XXLARGE,STERILE: Brand: MEDLINE

## (undated) DEVICE — SUCTION CANISTER, 3000CC,SAFELINER: Brand: DEROYAL

## (undated) DEVICE — PAD GRND REM POLYHESIVE A/ DISP

## (undated) DEVICE — STRAP STIRUP SLP RNG 19X3.5IN DISP

## (undated) DEVICE — FRCP BX RADJAW4 NDL 2.8 240CM LG OG BX40

## (undated) DEVICE — GLV SURG SENSICARE PI MIC PF SZ7.5 LF STRL

## (undated) DEVICE — TUBING, SUCTION, 1/4" X 12', STRAIGHT: Brand: MEDLINE

## (undated) DEVICE — CONMED ELECTROSURGICAL ACCESSORY, BALL ELECTRODE, 5 MM, EXTENDED SHAFT: Brand: CONMED

## (undated) DEVICE — JACKT LAB F/R KNIT CUFF/COLR XLG BLU

## (undated) DEVICE — CONTAINER,SPECIMEN,OR STERILE,4OZ: Brand: MEDLINE

## (undated) DEVICE — Device

## (undated) DEVICE — SUCTION CANISTER, 1000CC,SAFELINER: Brand: DEROYAL

## (undated) DEVICE — BW-412T DISP COMBO CLEANING BRUSH: Brand: SINGLE USE COMBINATION CLEANING BRUSH

## (undated) DEVICE — ST TBG DSE 6FT

## (undated) DEVICE — KT ORCA ORCAPOD DISP STRL

## (undated) DEVICE — SPNG GZ WOVN 4X4IN 12PLY 10/BX STRL

## (undated) DEVICE — PENCL E/S HNDSWCH PUSHBTN HOLSTR 10FT

## (undated) DEVICE — LAG PERI GYN: Brand: MEDLINE INDUSTRIES, INC.

## (undated) DEVICE — GLV SURG SENSICARE W/ALOE PF LF 7.5 STRL

## (undated) DEVICE — SWAB PROCTO 16 1/2IN STRL